# Patient Record
Sex: FEMALE | Race: WHITE | NOT HISPANIC OR LATINO | Employment: OTHER | URBAN - NONMETROPOLITAN AREA
[De-identification: names, ages, dates, MRNs, and addresses within clinical notes are randomized per-mention and may not be internally consistent; named-entity substitution may affect disease eponyms.]

---

## 2023-05-29 LAB — URINE CULTURE: NORMAL

## 2023-08-01 LAB
CHOLESTEROL (MG/DL) IN SER/PLAS: 154 MG/DL (ref 0–199)
CHOLESTEROL IN HDL (MG/DL) IN SER/PLAS: 55.6 MG/DL
CHOLESTEROL/HDL RATIO: 2.8
LDL: 81 MG/DL (ref 0–99)
TRIGLYCERIDE (MG/DL) IN SER/PLAS: 85 MG/DL (ref 0–149)
VLDL: 17 MG/DL (ref 0–40)

## 2023-09-25 PROBLEM — R11.0 CHRONIC NAUSEA: Status: ACTIVE | Noted: 2023-09-25

## 2023-09-25 PROBLEM — R92.8 ABNORMAL MAMMOGRAM OF RIGHT BREAST: Status: ACTIVE | Noted: 2023-09-25

## 2023-09-25 PROBLEM — G43.009 MIGRAINE WITHOUT AURA AND WITHOUT STATUS MIGRAINOSUS, NOT INTRACTABLE: Status: ACTIVE | Noted: 2023-09-25

## 2023-09-25 PROBLEM — F41.0 PANIC DISORDER: Status: ACTIVE | Noted: 2023-09-25

## 2023-09-25 PROBLEM — G47.411 CATAPLEXY (HHS-HCC): Status: ACTIVE | Noted: 2023-09-25

## 2023-09-25 PROBLEM — H69.90 EUSTACHIAN TUBE DYSFUNCTION: Status: ACTIVE | Noted: 2023-09-25

## 2023-09-25 PROBLEM — G47.19 EXCESSIVE DAYTIME SLEEPINESS: Status: ACTIVE | Noted: 2023-09-25

## 2023-09-25 PROBLEM — F31.60 BIPOLAR AFFECTIVE, MIXED (MULTI): Status: ACTIVE | Noted: 2023-09-25

## 2023-09-25 PROBLEM — M79.671 CHRONIC FOOT PAIN, RIGHT: Status: ACTIVE | Noted: 2023-09-25

## 2023-09-25 PROBLEM — N64.3 GALACTORRHEA IN FEMALE: Status: ACTIVE | Noted: 2023-09-25

## 2023-09-25 PROBLEM — N93.9 ABNORMAL VAGINAL BLEEDING: Status: ACTIVE | Noted: 2023-09-25

## 2023-09-25 PROBLEM — K50.90 CROHN'S DISEASE (MULTI): Status: ACTIVE | Noted: 2023-09-25

## 2023-09-25 PROBLEM — G89.4 CHRONIC PAIN SYNDROME: Status: ACTIVE | Noted: 2023-09-25

## 2023-09-25 PROBLEM — G89.29 CHRONIC FOOT PAIN, RIGHT: Status: ACTIVE | Noted: 2023-09-25

## 2023-09-25 PROBLEM — J45.901 ASTHMA EXACERBATION (HHS-HCC): Status: ACTIVE | Noted: 2023-09-25

## 2023-09-25 PROBLEM — E78.5 DYSLIPIDEMIA: Status: ACTIVE | Noted: 2023-09-25

## 2023-09-25 PROBLEM — F44.5 PSYCHIATRIC PSEUDOSEIZURE: Status: ACTIVE | Noted: 2023-09-25

## 2023-09-25 PROBLEM — G47.419 NARCOLEPSY WITHOUT CATAPLEXY (HHS-HCC): Status: ACTIVE | Noted: 2023-09-25

## 2023-09-25 PROBLEM — E55.9 VITAMIN D DEFICIENCY: Status: ACTIVE | Noted: 2023-09-25

## 2023-09-25 PROBLEM — R82.998 URINE WBC INCREASED: Status: ACTIVE | Noted: 2023-09-25

## 2023-09-25 PROBLEM — M41.9 SCOLIOSIS: Status: ACTIVE | Noted: 2023-09-25

## 2023-09-25 PROBLEM — F41.8 MIXED ANXIETY AND DEPRESSIVE DISORDER: Status: ACTIVE | Noted: 2023-09-25

## 2023-09-25 PROBLEM — F41.1 GAD (GENERALIZED ANXIETY DISORDER): Status: ACTIVE | Noted: 2023-09-25

## 2023-09-25 PROBLEM — G25.81 RESTLESS LEGS SYNDROME: Status: ACTIVE | Noted: 2023-09-25

## 2023-09-25 PROBLEM — G89.28 CHRONIC POST-OPERATIVE PAIN: Status: ACTIVE | Noted: 2023-09-25

## 2023-09-25 PROBLEM — F44.9 CONVERSION DISORDER: Status: ACTIVE | Noted: 2023-09-25

## 2023-09-26 RX ORDER — ESOMEPRAZOLE MAGNESIUM 40 MG/1
1 CAPSULE, DELAYED RELEASE ORAL DAILY
COMMUNITY

## 2023-09-26 RX ORDER — PERMETHRIN 50 MG/G
CREAM TOPICAL
COMMUNITY
Start: 2017-03-24 | End: 2024-01-17 | Stop reason: ALTCHOICE

## 2023-09-26 RX ORDER — TOPIRAMATE 100 MG/1
100 TABLET, FILM COATED ORAL 2 TIMES DAILY
COMMUNITY

## 2023-09-26 RX ORDER — TOPIRAMATE 100 MG/1
1 TABLET, FILM COATED ORAL NIGHTLY
COMMUNITY
Start: 2015-08-05 | End: 2024-01-17 | Stop reason: ALTCHOICE

## 2023-09-26 RX ORDER — PROMETHAZINE HYDROCHLORIDE 25 MG/1
1 TABLET ORAL EVERY 12 HOURS PRN
COMMUNITY

## 2023-09-26 RX ORDER — NAPROXEN 500 MG/1
TABLET ORAL
COMMUNITY
Start: 2017-03-22 | End: 2024-01-17 | Stop reason: ALTCHOICE

## 2023-09-26 RX ORDER — RABEPRAZOLE SODIUM 20 MG/1
1 TABLET, DELAYED RELEASE ORAL DAILY
COMMUNITY
End: 2024-01-17 | Stop reason: ALTCHOICE

## 2023-09-26 RX ORDER — NYSTATIN 100000 [USP'U]/ML
SUSPENSION ORAL
COMMUNITY
Start: 2017-01-09 | End: 2024-01-17 | Stop reason: ALTCHOICE

## 2023-09-26 RX ORDER — MENTHOL 40 MG/ML
GEL TOPICAL
COMMUNITY

## 2023-09-26 RX ORDER — HYDROXYZINE PAMOATE 25 MG/1
1 CAPSULE ORAL EVERY 8 HOURS PRN
COMMUNITY
Start: 2016-04-29

## 2023-09-26 RX ORDER — DEXTROAMPHETAMINE SACCHARATE, AMPHETAMINE ASPARTATE, DEXTROAMPHETAMINE SULFATE AND AMPHETAMINE SULFATE 2.5; 2.5; 2.5; 2.5 MG/1; MG/1; MG/1; MG/1
10 TABLET ORAL 2 TIMES DAILY
COMMUNITY
End: 2024-01-17 | Stop reason: ALTCHOICE

## 2023-09-26 RX ORDER — ONDANSETRON HYDROCHLORIDE 8 MG/1
1 TABLET, FILM COATED ORAL EVERY 8 HOURS
COMMUNITY
Start: 2016-02-23 | End: 2024-01-17 | Stop reason: ALTCHOICE

## 2023-09-26 RX ORDER — HYDROXYZINE HYDROCHLORIDE 25 MG/1
1 TABLET, FILM COATED ORAL EVERY 6 HOURS PRN
COMMUNITY
Start: 2023-08-16 | End: 2024-01-17 | Stop reason: ALTCHOICE

## 2023-09-26 RX ORDER — MESALAMINE 500 MG/1
2 CAPSULE, EXTENDED RELEASE ORAL 4 TIMES DAILY
COMMUNITY
Start: 2014-10-17

## 2023-09-26 RX ORDER — PANCRELIPASE 36000; 180000; 114000 [USP'U]/1; [USP'U]/1; [USP'U]/1
CAPSULE, DELAYED RELEASE PELLETS ORAL
COMMUNITY

## 2023-09-26 RX ORDER — DIPHENOXYLATE HYDROCHLORIDE AND ATROPINE SULFATE 2.5; .025 MG/1; MG/1
1 TABLET ORAL 4 TIMES DAILY PRN
COMMUNITY

## 2023-09-26 RX ORDER — PREDNISONE 20 MG/1
TABLET ORAL
COMMUNITY
Start: 2017-03-03 | End: 2024-01-17 | Stop reason: ALTCHOICE

## 2023-09-26 RX ORDER — CLONAZEPAM 1 MG/1
1 TABLET ORAL 2 TIMES DAILY
COMMUNITY
Start: 2015-10-08 | End: 2023-12-20 | Stop reason: ALTCHOICE

## 2023-09-26 RX ORDER — ALBUTEROL SULFATE 90 UG/1
1 AEROSOL, METERED RESPIRATORY (INHALATION) EVERY 4 HOURS
COMMUNITY
End: 2024-01-17 | Stop reason: ALTCHOICE

## 2023-09-26 RX ORDER — DULOXETIN HYDROCHLORIDE 30 MG/1
30 CAPSULE, DELAYED RELEASE ORAL
COMMUNITY
Start: 2023-08-16 | End: 2024-01-17 | Stop reason: ALTCHOICE

## 2023-09-26 RX ORDER — METHYLPREDNISOLONE 4 MG/1
TABLET ORAL
COMMUNITY
Start: 2023-09-20 | End: 2024-01-17 | Stop reason: ALTCHOICE

## 2023-09-26 RX ORDER — DEXTROAMPHETAMINE SACCHARATE, AMPHETAMINE ASPARTATE, DEXTROAMPHETAMINE SULFATE AND AMPHETAMINE SULFATE 2.5; 2.5; 2.5; 2.5 MG/1; MG/1; MG/1; MG/1
1 TABLET ORAL EVERY MORNING
COMMUNITY
Start: 2017-06-28 | End: 2024-01-17 | Stop reason: ALTCHOICE

## 2023-09-26 RX ORDER — MUPIROCIN 20 MG/G
OINTMENT TOPICAL
COMMUNITY
Start: 2017-03-03 | End: 2024-01-17 | Stop reason: ALTCHOICE

## 2023-09-26 RX ORDER — FLUTICASONE FUROATE AND VILANTEROL 100; 25 UG/1; UG/1
1 POWDER RESPIRATORY (INHALATION) DAILY
COMMUNITY

## 2023-09-26 RX ORDER — PREGABALIN 100 MG/1
1 CAPSULE ORAL 3 TIMES DAILY
COMMUNITY
Start: 2016-04-14 | End: 2024-01-17 | Stop reason: ALTCHOICE

## 2023-09-26 RX ORDER — LORATADINE PSEUDOEPHEDRINE SULFATE 10; 240 MG/1; MG/1
1 TABLET, EXTENDED RELEASE ORAL DAILY
COMMUNITY
Start: 2016-10-14 | End: 2024-01-17 | Stop reason: ALTCHOICE

## 2023-09-26 RX ORDER — OXYCODONE AND ACETAMINOPHEN 5; 325 MG/1; MG/1
TABLET ORAL
COMMUNITY
Start: 2017-03-22 | End: 2024-01-17 | Stop reason: ALTCHOICE

## 2023-09-26 RX ORDER — OSELTAMIVIR PHOSPHATE 75 MG/1
CAPSULE ORAL
COMMUNITY
Start: 2017-03-03 | End: 2024-01-17 | Stop reason: ALTCHOICE

## 2023-09-26 RX ORDER — ALBUTEROL SULFATE 90 UG/1
2 AEROSOL, METERED RESPIRATORY (INHALATION) EVERY 4 HOURS PRN
COMMUNITY
Start: 2016-09-26

## 2023-09-26 RX ORDER — SUCRALFATE 1 G/1
1 TABLET ORAL 2 TIMES DAILY
COMMUNITY

## 2023-09-26 RX ORDER — MOMETASONE FUROATE 1 MG/G
OINTMENT TOPICAL
COMMUNITY
Start: 2016-10-05 | End: 2024-01-17 | Stop reason: ALTCHOICE

## 2023-09-26 RX ORDER — VENLAFAXINE HYDROCHLORIDE 150 MG/1
1 CAPSULE, EXTENDED RELEASE ORAL DAILY
COMMUNITY
Start: 2016-03-31 | End: 2024-01-17 | Stop reason: ALTCHOICE

## 2023-09-26 RX ORDER — CLOTRIMAZOLE AND BETAMETHASONE DIPROPIONATE 10; .64 MG/G; MG/G
CREAM TOPICAL
COMMUNITY
Start: 2017-02-22 | End: 2023-12-20 | Stop reason: ALTCHOICE

## 2023-09-26 RX ORDER — FAMOTIDINE 40 MG/1
1 TABLET, FILM COATED ORAL NIGHTLY
COMMUNITY
End: 2023-12-20 | Stop reason: ALTCHOICE

## 2023-09-26 RX ORDER — AMMONIUM LACTATE 12 G/100G
CREAM TOPICAL
COMMUNITY
Start: 2016-07-29

## 2023-09-26 RX ORDER — TOPIRAMATE 25 MG/1
25 TABLET ORAL 2 TIMES DAILY
COMMUNITY
Start: 2023-08-16

## 2023-09-26 RX ORDER — LORATADINE 10 MG/1
1 CAPSULE, LIQUID FILLED ORAL DAILY
COMMUNITY
Start: 2016-10-05 | End: 2024-01-17 | Stop reason: ALTCHOICE

## 2023-09-26 RX ORDER — PREGABALIN 50 MG/1
1 CAPSULE ORAL 2 TIMES DAILY
COMMUNITY

## 2023-09-26 RX ORDER — OMEPRAZOLE 20 MG/1
1 CAPSULE, DELAYED RELEASE ORAL 2 TIMES DAILY
COMMUNITY
End: 2024-01-17 | Stop reason: ALTCHOICE

## 2023-09-26 RX ORDER — HYDROCODONE BITARTRATE AND ACETAMINOPHEN 5; 325 MG/1; MG/1
TABLET ORAL
COMMUNITY
Start: 2017-07-03 | End: 2024-01-17 | Stop reason: ALTCHOICE

## 2023-09-26 RX ORDER — SIMETHICONE 80 MG
TABLET,CHEWABLE ORAL AS NEEDED
COMMUNITY
End: 2024-01-17 | Stop reason: ALTCHOICE

## 2023-09-26 RX ORDER — DEXTROAMPHETAMINE SACCHARATE, AMPHETAMINE ASPARTATE, DEXTROAMPHETAMINE SULFATE AND AMPHETAMINE SULFATE 5; 5; 5; 5 MG/1; MG/1; MG/1; MG/1
1 TABLET ORAL 3 TIMES DAILY
COMMUNITY
End: 2024-01-17 | Stop reason: SDUPTHER

## 2023-09-26 RX ORDER — HYDROCORTISONE 0.5 G/100G
CREAM TOPICAL
COMMUNITY

## 2023-09-26 RX ORDER — POTASSIUM CHLORIDE 20 MEQ/1
2 TABLET, EXTENDED RELEASE ORAL DAILY
COMMUNITY
End: 2024-01-17 | Stop reason: ALTCHOICE

## 2023-09-26 RX ORDER — FLUTICASONE PROPIONATE 50 MCG
2 SPRAY, SUSPENSION (ML) NASAL DAILY
COMMUNITY
Start: 2017-06-28

## 2023-09-26 RX ORDER — ERGOCALCIFEROL 1.25 MG/1
1 CAPSULE ORAL
COMMUNITY
Start: 2016-11-15

## 2023-11-13 ENCOUNTER — APPOINTMENT (OUTPATIENT)
Dept: RADIOLOGY | Facility: HOSPITAL | Age: 53
End: 2023-11-13
Payer: COMMERCIAL

## 2023-11-13 ENCOUNTER — HOSPITAL ENCOUNTER (EMERGENCY)
Facility: HOSPITAL | Age: 53
Discharge: HOME | End: 2023-11-13
Attending: EMERGENCY MEDICINE
Payer: COMMERCIAL

## 2023-11-13 VITALS
BODY MASS INDEX: 26.22 KG/M2 | HEIGHT: 63 IN | WEIGHT: 148 LBS | HEART RATE: 97 BPM | SYSTOLIC BLOOD PRESSURE: 140 MMHG | RESPIRATION RATE: 18 BRPM | DIASTOLIC BLOOD PRESSURE: 97 MMHG | TEMPERATURE: 98.4 F | OXYGEN SATURATION: 100 %

## 2023-11-13 DIAGNOSIS — L01.00 IMPETIGO: ICD-10-CM

## 2023-11-13 DIAGNOSIS — M79.671 FOOT PAIN, RIGHT: ICD-10-CM

## 2023-11-13 DIAGNOSIS — L03.221 CELLULITIS OF NECK: Primary | ICD-10-CM

## 2023-11-13 LAB
ANION GAP SERPL CALC-SCNC: 11 MMOL/L (ref 10–20)
BASOPHILS # BLD AUTO: 0.04 X10*3/UL (ref 0–0.1)
BASOPHILS NFR BLD AUTO: 0.5 %
BUN SERPL-MCNC: 18 MG/DL (ref 6–23)
CALCIUM SERPL-MCNC: 8.7 MG/DL (ref 8.6–10.3)
CHLORIDE SERPL-SCNC: 108 MMOL/L (ref 98–107)
CO2 SERPL-SCNC: 23 MMOL/L (ref 21–32)
CREAT SERPL-MCNC: 0.94 MG/DL (ref 0.5–1.05)
EOSINOPHIL # BLD AUTO: 0.31 X10*3/UL (ref 0–0.7)
EOSINOPHIL NFR BLD AUTO: 3.8 %
ERYTHROCYTE [DISTWIDTH] IN BLOOD BY AUTOMATED COUNT: 15.3 % (ref 11.5–14.5)
GFR SERPL CREATININE-BSD FRML MDRD: 73 ML/MIN/1.73M*2
GLUCOSE SERPL-MCNC: 100 MG/DL (ref 74–99)
HCT VFR BLD AUTO: 35.4 % (ref 36–46)
HGB BLD-MCNC: 11.2 G/DL (ref 12–16)
IMM GRANULOCYTES # BLD AUTO: 0.04 X10*3/UL (ref 0–0.7)
IMM GRANULOCYTES NFR BLD AUTO: 0.5 % (ref 0–0.9)
LYMPHOCYTES # BLD AUTO: 1.55 X10*3/UL (ref 1.2–4.8)
LYMPHOCYTES NFR BLD AUTO: 18.8 %
MCH RBC QN AUTO: 26.3 PG (ref 26–34)
MCHC RBC AUTO-ENTMCNC: 31.6 G/DL (ref 32–36)
MCV RBC AUTO: 83 FL (ref 80–100)
MONOCYTES # BLD AUTO: 0.71 X10*3/UL (ref 0.1–1)
MONOCYTES NFR BLD AUTO: 8.6 %
NEUTROPHILS # BLD AUTO: 5.59 X10*3/UL (ref 1.2–7.7)
NEUTROPHILS NFR BLD AUTO: 67.8 %
NRBC BLD-RTO: 0 /100 WBCS (ref 0–0)
PLATELET # BLD AUTO: 412 X10*3/UL (ref 150–450)
POTASSIUM SERPL-SCNC: 3.6 MMOL/L (ref 3.5–5.3)
RBC # BLD AUTO: 4.26 X10*6/UL (ref 4–5.2)
SODIUM SERPL-SCNC: 138 MMOL/L (ref 136–145)
WBC # BLD AUTO: 8.2 X10*3/UL (ref 4.4–11.3)

## 2023-11-13 PROCEDURE — 87040 BLOOD CULTURE FOR BACTERIA: CPT | Mod: GENLAB | Performed by: EMERGENCY MEDICINE

## 2023-11-13 PROCEDURE — 96365 THER/PROPH/DIAG IV INF INIT: CPT

## 2023-11-13 PROCEDURE — 73630 X-RAY EXAM OF FOOT: CPT | Mod: LT,FR

## 2023-11-13 PROCEDURE — 2500000004 HC RX 250 GENERAL PHARMACY W/ HCPCS (ALT 636 FOR OP/ED): Mod: SE | Performed by: EMERGENCY MEDICINE

## 2023-11-13 PROCEDURE — 99285 EMERGENCY DEPT VISIT HI MDM: CPT | Mod: 25 | Performed by: EMERGENCY MEDICINE

## 2023-11-13 PROCEDURE — 87075 CULTR BACTERIA EXCEPT BLOOD: CPT | Mod: GENLAB | Performed by: EMERGENCY MEDICINE

## 2023-11-13 PROCEDURE — 36415 COLL VENOUS BLD VENIPUNCTURE: CPT | Performed by: EMERGENCY MEDICINE

## 2023-11-13 PROCEDURE — 96361 HYDRATE IV INFUSION ADD-ON: CPT

## 2023-11-13 PROCEDURE — 2550000001 HC RX 255 CONTRASTS: Mod: SE | Performed by: EMERGENCY MEDICINE

## 2023-11-13 PROCEDURE — 80048 BASIC METABOLIC PNL TOTAL CA: CPT | Performed by: EMERGENCY MEDICINE

## 2023-11-13 PROCEDURE — 99284 EMERGENCY DEPT VISIT MOD MDM: CPT | Mod: 25,27

## 2023-11-13 PROCEDURE — 70491 CT SOFT TISSUE NECK W/DYE: CPT | Performed by: RADIOLOGY

## 2023-11-13 PROCEDURE — 85025 COMPLETE CBC W/AUTO DIFF WBC: CPT | Performed by: EMERGENCY MEDICINE

## 2023-11-13 PROCEDURE — 70491 CT SOFT TISSUE NECK W/DYE: CPT

## 2023-11-13 PROCEDURE — 73630 X-RAY EXAM OF FOOT: CPT | Mod: LEFT SIDE | Performed by: RADIOLOGY

## 2023-11-13 RX ORDER — DOXYCYCLINE 100 MG/1
100 TABLET ORAL 2 TIMES DAILY
Qty: 14 TABLET | Refills: 0 | Status: SHIPPED | OUTPATIENT
Start: 2023-11-13 | End: 2023-11-20

## 2023-11-13 RX ORDER — SODIUM CHLORIDE 9 MG/ML
100 INJECTION, SOLUTION INTRAVENOUS CONTINUOUS
Status: DISCONTINUED | OUTPATIENT
Start: 2023-11-13 | End: 2023-11-13 | Stop reason: HOSPADM

## 2023-11-13 RX ORDER — CLINDAMYCIN PHOSPHATE 600 MG/50ML
600 INJECTION, SOLUTION INTRAVENOUS ONCE
Status: COMPLETED | OUTPATIENT
Start: 2023-11-13 | End: 2023-11-13

## 2023-11-13 RX ORDER — CLINDAMYCIN HYDROCHLORIDE 300 MG/1
300 CAPSULE ORAL 3 TIMES DAILY
Qty: 30 CAPSULE | Refills: 0 | Status: SHIPPED | OUTPATIENT
Start: 2023-11-13 | End: 2023-11-23

## 2023-11-13 RX ADMIN — SODIUM CHLORIDE 100 ML/HR: 9 INJECTION, SOLUTION INTRAVENOUS at 08:54

## 2023-11-13 RX ADMIN — CLINDAMYCIN PHOSPHATE 600 MG: 600 INJECTION, SOLUTION INTRAVENOUS at 08:56

## 2023-11-13 RX ADMIN — IOHEXOL 75 ML: 350 INJECTION, SOLUTION INTRAVENOUS at 09:16

## 2023-11-13 ASSESSMENT — PAIN DESCRIPTION - PAIN TYPE: TYPE: ACUTE PAIN

## 2023-11-13 ASSESSMENT — COLUMBIA-SUICIDE SEVERITY RATING SCALE - C-SSRS
1. IN THE PAST MONTH, HAVE YOU WISHED YOU WERE DEAD OR WISHED YOU COULD GO TO SLEEP AND NOT WAKE UP?: NO
2. HAVE YOU ACTUALLY HAD ANY THOUGHTS OF KILLING YOURSELF?: NO
6. HAVE YOU EVER DONE ANYTHING, STARTED TO DO ANYTHING, OR PREPARED TO DO ANYTHING TO END YOUR LIFE?: NO

## 2023-11-13 ASSESSMENT — PAIN SCALES - GENERAL: PAINLEVEL_OUTOF10: 7

## 2023-11-13 ASSESSMENT — PAIN DESCRIPTION - LOCATION: LOCATION: NECK

## 2023-11-13 ASSESSMENT — PAIN - FUNCTIONAL ASSESSMENT: PAIN_FUNCTIONAL_ASSESSMENT: 0-10

## 2023-11-13 NOTE — ED PROVIDER NOTES
EMERGENCY DEPARTMENT                                                     [facility]  ED  Provider Note  11/13/2023  8:09 AM  AC10/AC10                          History of Present Illness:   Benja Luis is a 53 y.o. female presenting to the ED for anterior neck infection and pain in the right second toe, beginning about 10 days ago.  The complaint has been persistent, moderate in severity, and worsened by nothing.  Patient just finished a course of clindamycin for an infection in her anterior neck.  The area remains red tender and swollen.  She also evidence of impetigo below her nose.  She complains of right second toe pain where she had previous osteomyelitis postsurgery.  There is no redness or swelling noted.  She says increasing pain.      Review of Systems:   Pertinent positives and review of systems as noted above.  Remaining 10 review of systems is negative or noncontributory to today's episode of care.  Review of Systems       --------------------------------------------- PAST HISTORY ---------------------------------------------  Past Medical History:  has a past medical history of Cervicalgia, Chronic sinusitis, unspecified, Other cervical disc degeneration, unspecified cervical region, Other conditions influencing health status, Personal history of (healed) traumatic fracture, Personal history of other diseases of the musculoskeletal system and connective tissue, Personal history of other diseases of the musculoskeletal system and connective tissue, Personal history of other diseases of the musculoskeletal system and connective tissue, Personal history of other infectious and parasitic diseases, Personal history of other mental and behavioral disorders, and Personal history of other specified conditions.    Past Surgical History:  has a past surgical history that includes Hysterectomy (09/19/2013); Other surgical history (09/19/2013);  Carpal tunnel release (09/19/2013); and Colonoscopy (09/19/2013).    Social History:  reports that she has never smoked. She has never used smokeless tobacco.    Family History: family history includes Back pain in an other family member. Unless otherwise noted, family history is non contributory    Patient's Medications   New Prescriptions    No medications on file   Previous Medications    ALBUTEROL (PROAIR HFA) 90 MCG/ACTUATION INHALER    Inhale 2 puffs every 4 hours if needed.    ALBUTEROL (PROVENTIL HFA) 90 MCG/ACTUATION INHALER    Inhale 1 puff every 4 hours.    AMMONIUM LACTATE (AMLACTIN) 12 % CREAM    APPLY  AND RUB  IN A THIN FILM TO AFFECTED AREAS TWICE DAILY.(AM AND PM).    AMPHETAMINE-DEXTROAMPHETAMINE (ADDERALL) 10 MG TABLET    Take 1 tablet (10 mg) by mouth once daily in the morning.    AMPHETAMINE-DEXTROAMPHETAMINE (ADDERALL) 10 MG TABLET    Take 1 tablet (10 mg) by mouth 2 times a day.    AMPHETAMINE-DEXTROAMPHETAMINE (ADDERALL) 20 MG TABLET    Take 1 tablet (20 mg) by mouth 3 times a day.    CLINDAMYCIN HCL ORAL    Take by mouth.    CLONAZEPAM (KLONOPIN) 1 MG TABLET    Take 1 tablet (1 mg) by mouth 2 times a day.    CLOTRIMAZOLE-BETAMETHASONE (LOTRISONE) CREAM    Clotrimazole-Betamethasone 1-0.05 % External Cream   Quantity: 45  Refills: 0        Start : 22-Feb-2017   Active    DIPHENOXYLATE-ATROPINE (LOMOTIL) 2.5-0.025 MG TABLET    Take 1 tablet by mouth 4 times a day as needed.    DULOXETINE (CYMBALTA) 30 MG DR CAPSULE    Take 1 capsule (30 mg) by mouth once daily.    ERGOCALCIFEROL (VITAMIN D-2) 1.25 MG (14582 UT) CAPSULE    Take 1 capsule (1,250 mcg) by mouth 1 (one) time per week.    ESOMEPRAZOLE (NEXIUM) 40 MG DR CAPSULE    Take 1 capsule (40 mg) by mouth once daily.    FAMOTIDINE (PEPCID) 40 MG TABLET    Take 1 tablet (40 mg) by mouth once daily at bedtime.    FLUTICASONE (FLONASE) 50 MCG/ACTUATION NASAL SPRAY    Administer 2 sprays into each nostril once daily.    FLUTICASONE  FUROATE-VILANTEROL (BREO ELLIPTA) 100-25 MCG/DOSE INHALER    Inhale 1 puff once daily.    HYDROCODONE-ACETAMINOPHEN (NORCO) 5-325 MG TABLET    HYDROcodone-Acetaminophen 5-325 MG Oral Tablet   Quantity: 30  Refills: 0        Start : 3-Jul-2017   Active    HYDROCORTISONE ACETATE 0.5 % CREAM    Externally    HYDROXYZINE HCL (ATARAX) 25 MG TABLET    Take 1 tablet (25 mg) by mouth every 6 hours if needed for anxiety or itching (or rash).    HYDROXYZINE PAMOATE (VISTARIL) 25 MG CAPSULE    Take 1 capsule (25 mg) by mouth every 8 hours if needed for itching or anxiety.    LORATADINE (CLARITIN LIQUI-GEL) 10 MG CAPSULE    Take 1 capsule by mouth once daily.    LORATADINE-PSEUDOEPHEDRINE (CLARITIN-D 24 HOUR)  MG 24 HR TABLET    Take 1 tablet by mouth once daily.    MENTHOL (BIOFREEZE, MENTHOL,) 4 % GEL    Apply topically to affected area 4 times a day, As Needed    MESALAMINE (PENTASA) 500 MG ER CAPSULE    Take 2 capsules (1,000 mg) by mouth 4 times a day.    METHYLPREDNISOLONE (MEDROL DOSPAK) 4 MG TABLETS    Use as directed    MOMETASONE (ELOCON) 0.1 % OINTMENT    APPLY SPARINGLY TO AFFECTED AREA(S) TWICE DAILY    MUPIROCIN (BACTROBAN) 2 % OINTMENT    Mupirocin 2 % External Ointment   Quantity: 22  Refills: 0        Start : 3-Mar-2017   Active    NAPROXEN (NAPROSYN) 500 MG TABLET    Naproxen 500 MG Oral Tablet   Quantity: 14  Refills: 0        Start : 22-Mar-2017   Active    NYSTATIN (MYCOSTATIN) 100,000 UNIT/ML SUSPENSION    SWISH AND SWALLOW  5 ML 3 TIMES DAILY    OMEPRAZOLE (PRILOSEC) 20 MG DR CAPSULE    Take 1 capsule (20 mg) by mouth 2 times a day.    ONDANSETRON (ZOFRAN) 8 MG TABLET    Take 1 tablet (8 mg) by mouth every 8 hours.    OSELTAMIVIR (TAMIFLU) 75 MG CAPSULE    Oseltamivir Phosphate 75 MG Oral Capsule   Quantity: 10  Refills: 0        Start : 3-Mar-2017   Active    OXYCODONE-ACETAMINOPHEN (PERCOCET) 5-325 MG TABLET    oxyCODONE-Acetaminophen 5-325 MG Oral Tablet   Quantity: 12  Refills: 0        Start :  22-Mar-2017   Active    PANCRELIPASE, LIP-PROT-AMYL, (CREON) 36,000-114,000- 180,000 UNIT CAPSULE,DELAYED RELEASE(DR/EC) CAPSULE    as directed Orally    PERMETHRIN (ELIMITE) 5 % CREAM    Permethrin 5 % External Cream   Quantity: 60  Refills: 0        Start : 24-Mar-2017   Active    POTASSIUM CHLORIDE CR 20 MEQ ER TABLET    Take 2 tablets (40 mEq) by mouth once daily.    PREDNISONE (DELTASONE) 20 MG TABLET    predniSONE 20 MG Oral Tablet   Quantity: 5  Refills: 0        Start : 3-Mar-2017   Active    PREGABALIN (LYRICA) 100 MG CAPSULE    Take 1 capsule (100 mg) by mouth 3 times a day.    PREGABALIN (LYRICA) 50 MG CAPSULE    Take 1 capsule (50 mg) by mouth 2 times a day.    PROMETHAZINE (PHENERGAN) 25 MG TABLET    Take 1 tablet (25 mg) by mouth every 12 hours if needed.    RABEPRAZOLE (ACIPHEX) EC TABLET    Take 1 tablet (20 mg) by mouth once daily.    SIMETHICONE (MYLICON) 80 MG CHEWABLE TABLET    Chew if needed.    SUCRALFATE (CARAFATE) 1 GRAM TABLET    Take 1 tablet (1 g) by mouth 2 times a day. On an empty stomach    TOPIRAMATE (TOPAMAX) 100 MG TABLET    Take 1 tablet (100 mg) by mouth once daily at bedtime.    TOPIRAMATE (TOPAMAX) 100 MG TABLET    Take 1 tablet (100 mg) by mouth 2 times a day.    TOPIRAMATE (TOPAMAX) 25 MG TABLET    Take 1 tablet (25 mg) by mouth twice a day.    VENLAFAXINE XR (EFFEXOR-XR) 150 MG 24 HR CAPSULE    Take 1 capsule (150 mg) by mouth once daily.   Modified Medications    No medications on file   Discontinued Medications    No medications on file      The patient’s home medications have been reviewed.    Allergies: Penicillins, Broccoli, Cabbage, Cauliflower, Diclofenac, Fluoxetine, Gabapentin, Meloxicam, Nalbuphine, Olive extract, Onion, Oseltamivir, Peanut, Peas, Potato, Prochlorperazine, Sesame seed, Soybean, Squash, Sulfa (sulfonamide antibiotics), Sulfamethoxazole-trimethoprim, Sunflower seed, and Ciprofloxacin    -------------------------------------------------- RESULTS  -------------------------------------------------  All laboratory and radiology results have been personally reviewed by myself   LABS:  Labs Reviewed   CBC WITH AUTO DIFFERENTIAL - Abnormal       Result Value    WBC 8.2      nRBC 0.0      RBC 4.26      Hemoglobin 11.2 (*)     Hematocrit 35.4 (*)     MCV 83      MCH 26.3      MCHC 31.6 (*)     RDW 15.3 (*)     Platelets 412      Neutrophils % 67.8      Immature Granulocytes %, Automated 0.5      Lymphocytes % 18.8      Monocytes % 8.6      Eosinophils % 3.8      Basophils % 0.5      Neutrophils Absolute 5.59      Immature Granulocytes Absolute, Automated 0.04      Lymphocytes Absolute 1.55      Monocytes Absolute 0.71      Eosinophils Absolute 0.31      Basophils Absolute 0.04     BASIC METABOLIC PANEL - Abnormal    Glucose 100 (*)     Sodium 138      Potassium 3.6      Chloride 108 (*)     Bicarbonate 23      Anion Gap 11      Urea Nitrogen 18      Creatinine 0.94      eGFR 73      Calcium 8.7     BLOOD CULTURE   BLOOD CULTURE         RADIOLOGY:  Interpreted by Radiologist.  CT soft tissue neck w IV contrast   Final Result   1. Focal skin thickening and subcutaneous edema within the submental   space is probably sequela of an infectious process. No focal fluid   collection to suggest an abscess.        2. Small air-fluid level in the right maxillary sinus is a   nonspecific finding but can be seen with acute sinusitis, correlate   clinically.        This study was interpreted at [facility].             MACRO:   None        Signed by: Jim Eagle 11/13/2023 9:29 AM   Dictation workstation:   [id number]      XR foot left 3+ views   Final Result   No acute osseous abnormality.   Signed by Jose Walton MD          No results found for this or any previous visit (from the past 4464 hour(s)).  ------------------------- NURSING NOTES AND VITALS REVIEWED ---------------------------   The nursing notes within the ED encounter and  "vital signs as below have been reviewed.   BP (!) 140/97   Pulse 97   Temp 36.9 °C (98.4 °F) (Temporal)   Resp 18   Ht 1.6 m (5' 3\")   Wt 67.1 kg (148 lb)   SpO2 100%   BMI 26.22 kg/m²   Oxygen Saturation Interpretation: Normal      ---------------------------------------------------PHYSICAL EXAM--------------------------------------  Physical Exam   Constitutional/General: Alert and oriented x3, well appearing, non toxic in NAD  Head: Normocephalic and atraumatic  Eyes: PERRL, EOMI, conjunctiva normal, sclera non icteric  Mouth: Oropharynx clear, handling secretions, no trismus, no asymmetry of the posterior oropharynx or uvular edema  Neck: Supple, full ROM, non tender to palpation in the midline, no stridor, no crepitus, no meningeal signs  Respiratory: Lungs clear to auscultation bilaterally, no wheezes, rales, or rhonchi. Not in respiratory distress  Cardiovascular:  Regular rate. Regular rhythm. No murmurs, gallops, or rubs. 2+ distal pulses  Chest: No chest wall tenderness  GI:  Abdomen Soft, Non tender, Non distended.  +BS. No organomegaly, no palpable masses,  No rebound, guarding, or rigidity.   Musculoskeletal: Moves all extremities x 4. Warm and well perfused, no clubbing, cyanosis, or edema. Capillary refill <3 seconds  Integument: skin warm and dry.  Lymphatic: no lymphadenopathy noted  Neurologic: GCS 15, no focal deficits, symmetric strength 5/5 in the upper and lower extremities bilaterally  Psychiatric: Normal Affect    Procedures    ------------------------------ ED COURSE/MEDICAL DECISION MAKING----------------------    Medical Decision Making:   Patient has cellulitis in her anterior submental neck, impetigo below her nose and some mild pain in her right second toe.  CT scan of the neck shows thickening of the skin but no fluid collection or phlegmon.  X-ray of the foot shows evidence of osteomyelitis.  White blood cell count is normal.  Vital signs are stable.  She will manage with warm " compresses continue antibiotics and follow-up with her doctor in 1 week.  Diagnoses as of 11/13/23 1022   Cellulitis of neck   Foot pain, right   Impetigo      Counseling:   The emergency provider has spoken with the patient and discussed today’s results, in addition to providing specific details for the plan of care and counseling regarding the diagnosis and prognosis.  Questions are answered at this time and they are agreeable with the plan.      --------------------------------- IMPRESSION AND DISPOSITION ---------------------------------        IMPRESSION  1. Cellulitis of neck    2. Foot pain, right    3. Impetigo        DISPOSITION  Disposition: Discharge to home  Patient condition is fair      Billing Provider Critical Care Time: 0 minutes     Edward Handy MD  11/13/23 1026

## 2023-11-13 NOTE — DISCHARGE INSTRUCTIONS
Doxycycline and clindamycin as prescribed.    Take Tylenol for pain every 4-6 hours as needed.    Use warm compresses 3 times per day on the skin infection on your neck.  Do not squeeze or poke at this lesion.    See your doctor 1 week for recheck.    Return for worsening symptoms or concerns

## 2023-11-17 LAB
BACTERIA BLD CULT: NORMAL
BACTERIA BLD CULT: NORMAL

## 2023-12-20 ENCOUNTER — OFFICE VISIT (OUTPATIENT)
Dept: PULMONOLOGY | Facility: CLINIC | Age: 53
End: 2023-12-20
Payer: COMMERCIAL

## 2023-12-20 VITALS
BODY MASS INDEX: 26.04 KG/M2 | HEART RATE: 83 BPM | DIASTOLIC BLOOD PRESSURE: 83 MMHG | WEIGHT: 147 LBS | OXYGEN SATURATION: 99 % | SYSTOLIC BLOOD PRESSURE: 122 MMHG

## 2023-12-20 DIAGNOSIS — J45.909 ASTHMA, UNSPECIFIED ASTHMA SEVERITY, UNSPECIFIED WHETHER COMPLICATED, UNSPECIFIED WHETHER PERSISTENT (HHS-HCC): Primary | ICD-10-CM

## 2023-12-20 DIAGNOSIS — R06.02 SHORTNESS OF BREATH: ICD-10-CM

## 2023-12-20 DIAGNOSIS — G47.30 SLEEP-DISORDERED BREATHING: ICD-10-CM

## 2023-12-20 PROCEDURE — 99205 OFFICE O/P NEW HI 60 MIN: CPT | Performed by: PEDIATRICS

## 2023-12-20 PROCEDURE — 1036F TOBACCO NON-USER: CPT | Performed by: PEDIATRICS

## 2023-12-20 NOTE — PROGRESS NOTES
Subjective   Patient ID: Benja Luis is a 53 y.o. female who presents for asthma, narcolepsy, SOB     HPI    NOTE:  records from [facility] pulmonary are in media section dated 4/22/2019    Ms Luis is a 53yr old with asthma well controlled with breo and occasional albuterol.  She is here to establish care.  She had previously seem Dr De Los Santos at [facility] but per the patient he told her she was too complex for him to manage due to her many medical issues.  She complains that she doesn't get any oxygen and gets very short of breath any time she eats.  She also complains that if she eats hot food the roof of her mouth gets very itchy.  She was told at some point she had eosinophilic esophagitis and she feels that is flairing up as well, that causes epigastric pain, chest wall pain and right shoulder blade pain.     She is no longer seeing an allergist  She has a diagnosis of narcolepsy and was treated at [facility] for that.    She never smoked    Review of Systems    See scanned documents attached to this note for review of systems, and appropriate scales/scores for this visit.     Objective   Physical Exam  Constitutional:       Appearance: Normal appearance.   HENT:      Head: Normocephalic and atraumatic.      Mouth/Throat:      Pharynx: Oropharynx is clear.   Cardiovascular:      Rate and Rhythm: Normal rate and regular rhythm.      Pulses: Normal pulses.      Heart sounds: Normal heart sounds.   Pulmonary:      Effort: Pulmonary effort is normal.      Breath sounds: Normal breath sounds. No wheezing, rhonchi or rales.   Abdominal:      General: Bowel sounds are normal.      Palpations: Abdomen is soft.   Musculoskeletal:         General: Normal range of motion.   Skin:     General: Skin is warm and dry.   Neurological:      General: No focal deficit present.      Mental Status: She is alert and oriented to person, place, and time.   Psychiatric:         Mood and Affect: Mood normal.             Assessment/Plan     53yr old with  asthma, ?eosinophilic esophagitis, complex sleep disorder, ?allergic food reactions    Asthma:  seems the least of her issues currently  - will get PFT to establish baseline  - continue breo and alburterol as needed    Sleep disorder:  ?narcolepsy  - will set up with sleep medicine    3. ?Eosinophilic esophagitis:  - follow up with GI  - allergy referral (also many food allergies per patient and these apparently exacerbate her asthma    Follow up with Giovana Rice and myself after PFT           Bryan Davis MD 12/20/23 2:37 PM

## 2024-01-03 ENCOUNTER — APPOINTMENT (OUTPATIENT)
Dept: SURGERY | Facility: CLINIC | Age: 54
End: 2024-01-03
Payer: MEDICARE

## 2024-01-05 ENCOUNTER — APPOINTMENT (OUTPATIENT)
Dept: RADIOLOGY | Facility: HOSPITAL | Age: 54
End: 2024-01-05
Payer: MEDICARE

## 2024-01-10 ENCOUNTER — APPOINTMENT (OUTPATIENT)
Dept: SURGERY | Facility: CLINIC | Age: 54
End: 2024-01-10
Payer: MEDICARE

## 2024-01-10 ENCOUNTER — APPOINTMENT (OUTPATIENT)
Dept: RADIOLOGY | Facility: HOSPITAL | Age: 54
End: 2024-01-10
Payer: MEDICARE

## 2024-01-15 ENCOUNTER — APPOINTMENT (OUTPATIENT)
Dept: RADIOLOGY | Facility: HOSPITAL | Age: 54
End: 2024-01-15
Payer: MEDICARE

## 2024-01-17 ENCOUNTER — APPOINTMENT (OUTPATIENT)
Dept: PULMONOLOGY | Facility: CLINIC | Age: 54
End: 2024-01-17
Payer: MEDICARE

## 2024-01-17 ENCOUNTER — HOSPITAL ENCOUNTER (OUTPATIENT)
Dept: RADIOLOGY | Facility: HOSPITAL | Age: 54
Discharge: HOME | End: 2024-01-17
Payer: MEDICARE

## 2024-01-17 ENCOUNTER — OFFICE VISIT (OUTPATIENT)
Dept: SLEEP MEDICINE | Facility: CLINIC | Age: 54
End: 2024-01-17
Payer: MEDICARE

## 2024-01-17 ENCOUNTER — APPOINTMENT (OUTPATIENT)
Dept: RADIOLOGY | Facility: HOSPITAL | Age: 54
End: 2024-01-17
Payer: MEDICARE

## 2024-01-17 VITALS
BODY MASS INDEX: 26.36 KG/M2 | DIASTOLIC BLOOD PRESSURE: 87 MMHG | OXYGEN SATURATION: 98 % | HEART RATE: 84 BPM | SYSTOLIC BLOOD PRESSURE: 126 MMHG | WEIGHT: 148.8 LBS

## 2024-01-17 DIAGNOSIS — N64.89 OTHER SPECIFIED DISORDERS OF BREAST: ICD-10-CM

## 2024-01-17 DIAGNOSIS — G47.19 EXCESSIVE DAYTIME SLEEPINESS: ICD-10-CM

## 2024-01-17 DIAGNOSIS — G47.30 SLEEP-DISORDERED BREATHING: ICD-10-CM

## 2024-01-17 DIAGNOSIS — E66.3 OVERWEIGHT (BMI 25.0-29.9): ICD-10-CM

## 2024-01-17 DIAGNOSIS — Z79.899 MEDICATION MANAGEMENT: ICD-10-CM

## 2024-01-17 DIAGNOSIS — R53.83 FATIGUE, UNSPECIFIED TYPE: ICD-10-CM

## 2024-01-17 DIAGNOSIS — G47.419 PRIMARY NARCOLEPSY WITHOUT CATAPLEXY (HHS-HCC): Primary | ICD-10-CM

## 2024-01-17 PROCEDURE — 77061 BREAST TOMOSYNTHESIS UNI: CPT | Mod: RT

## 2024-01-17 PROCEDURE — G0279 TOMOSYNTHESIS, MAMMO: HCPCS | Mod: RIGHT SIDE | Performed by: RADIOLOGY

## 2024-01-17 PROCEDURE — 3008F BODY MASS INDEX DOCD: CPT

## 2024-01-17 PROCEDURE — 99205 OFFICE O/P NEW HI 60 MIN: CPT

## 2024-01-17 PROCEDURE — 77065 DX MAMMO INCL CAD UNI: CPT | Mod: RIGHT SIDE | Performed by: RADIOLOGY

## 2024-01-17 PROCEDURE — 1036F TOBACCO NON-USER: CPT

## 2024-01-17 RX ORDER — DEXTROAMPHETAMINE SACCHARATE, AMPHETAMINE ASPARTATE, DEXTROAMPHETAMINE SULFATE AND AMPHETAMINE SULFATE 2.5; 2.5; 2.5; 2.5 MG/1; MG/1; MG/1; MG/1
10 TABLET ORAL 3 TIMES DAILY
Qty: 90 TABLET | Refills: 0 | Status: SHIPPED | OUTPATIENT
Start: 2024-01-17 | End: 2024-02-23 | Stop reason: SDUPTHER

## 2024-01-17 RX ORDER — DEXTROAMPHETAMINE SACCHARATE, AMPHETAMINE ASPARTATE, DEXTROAMPHETAMINE SULFATE AND AMPHETAMINE SULFATE 5; 5; 5; 5 MG/1; MG/1; MG/1; MG/1
1 TABLET ORAL 3 TIMES DAILY
Qty: 90 TABLET | Refills: 0 | Status: SHIPPED | OUTPATIENT
Start: 2024-01-17 | End: 2024-01-17 | Stop reason: ENTERED-IN-ERROR

## 2024-01-17 ASSESSMENT — ANXIETY QUESTIONNAIRES
6. BECOMING EASILY ANNOYED OR IRRITABLE: NEARLY EVERY DAY
3. WORRYING TOO MUCH ABOUT DIFFERENT THINGS: NEARLY EVERY DAY
1. FEELING NERVOUS, ANXIOUS, OR ON EDGE: NEARLY EVERY DAY
7. FEELING AFRAID AS IF SOMETHING AWFUL MIGHT HAPPEN: NEARLY EVERY DAY
GAD7 TOTAL SCORE: 21
5. BEING SO RESTLESS THAT IT IS HARD TO SIT STILL: NEARLY EVERY DAY
4. TROUBLE RELAXING: NEARLY EVERY DAY
2. NOT BEING ABLE TO STOP OR CONTROL WORRYING: NEARLY EVERY DAY

## 2024-01-17 ASSESSMENT — SLEEP AND FATIGUE QUESTIONNAIRES
HOW LIKELY ARE YOU TO NOD OFF OR FALL ASLEEP WHEN YOU ARE A PASSENGER IN A CAR FOR AN HOUR WITHOUT A BREAK: MODERATE CHANCE OF DOZING
WORRIED_DISTRESSED_DUE_TO_SLEEP: MUCH
HOW LIKELY ARE YOU TO NOD OFF OR FALL ASLEEP WHILE SITTING QUIETLY AFTER LUNCH WITHOUT ALCOHOL: HIGH CHANCE OF DOZING
HOW LIKELY ARE YOU TO NOD OFF OR FALL ASLEEP WHILE SITTING AND TALKING TO SOMEONE: MODERATE CHANCE OF DOZING
SITING INACTIVE IN A PUBLIC PLACE LIKE A CLASS ROOM OR A MOVIE THEATER: HIGH CHANCE OF DOZING
HOW LIKELY ARE YOU TO NOD OFF OR FALL ASLEEP WHILE WATCHING TV: HIGH CHANCE OF DOZING
SATISFACTION_WITH_CURRENT_SLEEP_PATTERN: DISSATISFIED
HOW LIKELY ARE YOU TO NOD OFF OR FALL ASLEEP IN A CAR, WHILE STOPPED FOR A FEW MINUTES IN TRAFFIC: HIGH CHANCE OF DOZING
DIFFICULTY_STAYING_ASLEEP: MODERATE
HOW LIKELY ARE YOU TO NOD OFF OR FALL ASLEEP WHILE LYING DOWN TO REST IN THE AFTERNOON WHEN CIRCUMSTANCES PERMIT: MODERATE CHANCE OF DOZING
SLEEP_PROBLEM_NOTICEABLE_TO_OTHERS: VERY MUCH NOTICEABLE
ESS-CHAD TOTAL SCORE: 21
SLEEP_PROBLEM_INTERFERES_DAILY_ACTIVITIES: VERY MUCH NOTICEABLE
HOW LIKELY ARE YOU TO NOD OFF OR FALL ASLEEP WHILE SITTING AND READING: HIGH CHANCE OF DOZING

## 2024-01-17 ASSESSMENT — PATIENT HEALTH QUESTIONNAIRE - PHQ9
2. FEELING DOWN, DEPRESSED OR HOPELESS: NEARLY EVERY DAY
SUM OF ALL RESPONSES TO PHQ9 QUESTIONS 1 AND 2: 6
1. LITTLE INTEREST OR PLEASURE IN DOING THINGS: NEARLY EVERY DAY

## 2024-01-17 NOTE — PROGRESS NOTES
Patient: Benja Luis  : 1970 AGE: 53 y.o. SEX:female   MRN: [id number]   Provider: [facility]     Location [facility]   Service Date: 2024     PCP: David Singer MD   Referred by: Bryan Davis, *            [facility]  NEW PATIENT VISIT NOTE        PATIENT INFORMATION     The patient's referring provider is: Bryan Davis, *  The patient's Primary Care Provider: David Singer MD    HISTORY OF PRESENT ILLNESS     Patient ID: Benja Luis is a 53 y.o. female who presents to a [facility] for evaluation for evaluation for sleep apnea, Excessive Daytime Sleepiness (EDS), Fatigue, Narcolepsy, Hypersomnia, and Medication Management    Patient is here alone today.  The patient has pertinent hx of Narcolepsy without cataplexy, inadequate sleep hygiene, EDS, fatigue, overweight, Asthma, psychiatric pseudoseizure, seasonal allergies, Bipolar, depression, anxiety, conversion disorder, RLS, Chrons disease, scoliosis, back pain, migraines, and chronic pain.     Patient here today to get established for treatment of narcolepsy. Patient was a previous patient of Dr. Gonzalez, who has not seen the patient in over 1 year (last seen ). Patient has continued to take Adderall 20 mg TID. However, per OARRS report, she has not filled the prescription in 2022. Her most recent urine drug screen from [facility] was positive for amphetamines. She reports she is getting a prescription for 20 mg TID, which she has prescription bottle with her name and recent prescribed from [facility]. We had a lengthy discussion about stimulant and controlled substances. She will need to complete a urine drug screen prior to next refill of medication. I will start the patient on Adderall 10 mg BID and go from there. She is agreeable. CSA was signed today in clinic. Reviewed EKD from ED in 2023 QT/QTC was 394/465, most  recent stress test and echo were WNL.     Known hx of narcolepsy. Patient has been treated in the past with Ritalin, did not help long-term, was switched to Adderall which has been helping up till a few months ago. She started noticing she is falling asleep again during regular activities such as cooking dinner or talking with others. She has not tried Modafinil or Armodafinil, Wakix or Xywav. She is slightly familiar with Xywav as Dr. Gonzalez has suggested in the past.  She denied any symptoms of cataplexy. I do not have any testing from [facility], plan to start with PSG + MSLT. I did discuss with patient that if PSG is positive we will start on PAP therapy. If negative they will proceed with MSLT. She is currently on anti-depressants which her BiPolar is stable. Will not discontinue medications at this time. Continue with prescribed medications. Instructed patient to stop stimulant the day prior to her sleep study, resume after testing. Sleep log was given to patient today in clinic.     RLS symptoms controlled fairly well with medication. Not bothersome to the patient at this time. Does have chronic back and generalized body pains. Currently on Tramadol. Did have an accidental overdose in 7/2023 on Tramadol, she continues to be prescribed Tramadol since then with no other reported incidents. We discussed any illicit drug use or abuse of prescription will results in dismissal from practice as well as no prescription refills. She verbalized understanding. Plan to get urine drug screen before next refill.     Patient reports that most recently she has started to notice that she is awakening during the night gasping for air. Previous testing for HILDA was negative. Potential for HILDA, plan to start with PSG + MSLT. If PSG is positive, will start with PAP therapy first.       SLEEP HISTORY     SLEEP STUDY HISTORY  No results available for review    SLEEP-WAKE SCHEDULE  Bedtime:  7 - 9 pm daily  Subjective sleep latency: less  than 5 mins  Difficulty falling asleep: No    Number of awakenings: rarely   Falls back asleep in few minutes  Difficulty staying asleep: No   Final wake time:  4 am daily  Out of bed time:  4 am daily  Shift work: denied  Naps: throughout the day 10 - 15 mins  Feels rested after a nap: Yes   Average sleep duration (excluding naps): 6 - 7 hrs    SLEEP ENVIRONMENT  Sleep location: bed  Sleep status: sleeps alone  Preferred sleep position: back  TV in bedroom: no  Room is dark:  Yes  Room is quiet: Yes  Room is cool: Yes  Bed comfort: good    SLEEP HABITS   Activities before bedtime: TV  Activities in bed: none  Clockwatching: No   Smoking: never  ETOH: denied  Marijuana: denied  Caffeine: denied  Sleep aids: denied    WEIGHT: stable    Claustrophobia: No     STOP-BAN  ESS: 21   FAN: 16  PHQ-2:  POSITIVE  little interest or pleasure = 3  down, depressed or hopeless = 3  TAMMI-7: 21      REVIEW OF SYSTEMS     REVIEW OF SYSTEMS  Sleep-related ROS:  Night symptoms: POSITIVE for wake up gasping and/or choking for air and NEGATIVE for snoring, witnessed apnea, nasal congestion and/or post nasal drip, mouth breathing, night sweats during sleep, waking up with racing heart, heartburn or sour taste in mouth at night, nocturnal cough, and nocturia  Morning symptoms: POSITIVE for unrefreshing sleep and NEGATIVE for morning headache, morning dry mouth, and morning sore throat  Daytime symptoms POSITIVE for excessive daytime sleepiness, fatigue, trouble remembering things in daytime, trouble staying focused in daytime, and drowsy driving and NEGATIVE for irritability in daytime  Hypersomnia / narcolepsy symptoms: POSITIVE for daytime sleepiness since young age, sleep-related hallucinations, and disturbed nocturnal sleep  Parasomnia symptoms: Patient denies symptoms of parasomnia.  Movements in sleep: Patient denies problematic movements in sleep,     RLS screen:  RLSSCREEN: - Sensations: Patient has unusual sensations in their  extremities that cause an urge to move them   - Frequency: infrequently   - Relief: Symptoms ARE/ARENOT: are not relieved with movement   - Circadian: Symptoms ARE/ARENOT: are not typically improved later in the night.   - Movement: Patient has not been told that their legs kick or jerk during sleep    Naps:   Yes. Naps ARE/ARENOT: are refreshing.  Fatigue: struggles to carry out day to day responsibilities.      All other systems have been reviewed and are negative.      ALLERGIES AND MEDICATIONS     ALLERGIES  Allergies   Allergen Reactions    Penicillins Anaphylaxis, Hives and Swelling    Broccoli Unknown    Cabbage Unknown    Cauliflower Unknown    Diclofenac Unknown    Fluoxetine Unknown    Gabapentin Unknown    Meloxicam Unknown    Nalbuphine Unknown    Olive Extract Unknown    Onion Unknown    Oseltamivir Unknown    Peanut Unknown    Peas Unknown    Potato Unknown    Prochlorperazine Unknown    Sesame Seed Unknown    Soybean Unknown    Squash Unknown    Sulfa (Sulfonamide Antibiotics) Unknown    Sulfamethoxazole-Trimethoprim Unknown    Sunflower Seed Unknown    Ciprofloxacin Rash, Other and Unknown     Respiratory distress        MEDICATIONS  Current Outpatient Medications   Medication Sig Dispense Refill    albuterol (ProAir HFA) 90 mcg/actuation inhaler Inhale 2 puffs every 4 hours if needed.      ammonium lactate (Amlactin) 12 % cream APPLY  AND RUB  IN A THIN FILM TO AFFECTED AREAS TWICE DAILY.(AM AND PM).      amphetamine-dextroamphetamine (Adderall) 10 mg tablet Take 1 tablet (10 mg) by mouth 3 times a day. 90 tablet 0    CLINDAMYCIN HCL ORAL Take by mouth.      diphenoxylate-atropine (LomotiL) 2.5-0.025 mg tablet Take 1 tablet by mouth 4 times a day as needed.      ergocalciferol (Vitamin D-2) 1.25 MG (40380 UT) capsule Take 1 capsule (1,250 mcg) by mouth 1 (one) time per week.      esomeprazole (NexIUM) 40 mg DR capsule Take 1 capsule (40 mg) by mouth once daily.      fluticasone (Flonase) 50  mcg/actuation nasal spray Administer 2 sprays into each nostril once daily.      fluticasone furoate-vilanteroL (Breo Ellipta) 100-25 mcg/dose inhaler Inhale 1 puff once daily.      hydrocortisone acetate 0.5 % cream Externally      hydrOXYzine pamoate (Vistaril) 25 mg capsule Take 1 capsule (25 mg) by mouth every 8 hours if needed for itching or anxiety.      menthol (Biofreeze, menthol,) 4 % gel Apply topically to affected area 4 times a day, As Needed      mesalamine (Pentasa) 500 mg ER capsule Take 2 capsules (1,000 mg) by mouth 4 times a day.      pancrelipase, Lip-Prot-Amyl, (Creon) 36,000-114,000- 180,000 unit capsule,delayed release(DR/EC) capsule as directed Orally      pregabalin (Lyrica) 50 mg capsule Take 1 capsule (50 mg) by mouth 2 times a day.      promethazine (Phenergan) 25 mg tablet Take 1 tablet (25 mg) by mouth every 12 hours if needed.      sucralfate (Carafate) 1 gram tablet Take 1 tablet (1 g) by mouth 2 times a day. On an empty stomach      topiramate (Topamax) 100 mg tablet Take 1 tablet (100 mg) by mouth 2 times a day.      topiramate (Topamax) 25 mg tablet Take 1 tablet (25 mg) by mouth twice a day.       No current facility-administered medications for this visit.         PAST HISTORY     PERTINENT PAST MEDICAL HISTORY: See HPI    PERTINENT PAST SURGICAL HISTORY for Sleep Medicine:  non-contributory    PERTINENT FAMILY HISTORY for Sleep Medicine:  Unknown. Patient is adopted.     PERTINENT SOCIAL HISTORY:  She  reports that she has never smoked. She has never used smokeless tobacco. No history on file for alcohol use and drug use. She currently lives alone and employed full-time    Active Problems, Allergy List, Medication List, and PMH/PSH/FH/Social Hx have been reviewed and reconciled in chart. No significant changes unless documented in the pertinent chart section. Updates made when necessary.       PHYSICAL EXAM     VITAL SIGNS: /87   Pulse 84   Wt 67.5 kg (148 lb 12.8 oz)    "SpO2 98%   BMI 26.36 kg/m²     NECK CIRCUMFERENCE:     CURRENT WEIGHT:   Vitals:    01/17/24 1127   Weight: 67.5 kg (148 lb 12.8 oz)      BMI: Body mass index is 26.36 kg/m².    PREVIOUS WEIGHTS:  Wt Readings from Last 3 Encounters:   01/17/24 67.5 kg (148 lb 12.8 oz)   12/20/23 66.7 kg (147 lb)   11/13/23 67.1 kg (148 lb)       Physical Exam  Constitutional: Awake, not in distress  Lungs: Clear to auscultation bilateral, no cough noted  Heart: Regular rate and rhythm  Skin: Warm, no rash  Neuro: No tremors, moves all extremities  Psych: alert and oriented to time, place, and person    ENT: Modified Mallampati score - III            RESULTS/DATA     No results found for: \"IRON\", \"TRANSFERRIN\", \"IRONSAT\", \"TIBC\", \"FERRITIN\"    Bicarbonate   Date Value Ref Range Status   11/13/2023 23 21 - 32 mmol/L Final       PAP Adherence  Not applicable      ASSESSMENT/PLAN     Assessment/Plan   Benja Luis is a 53 y.o. female presents today in [facility] with the following problems:      SUSPECTED SLEEP APNEA ,   - Patient's risk factors for HILDA: age, postmenopause, asthma, and narrow crowded upper airway anatomy  - Current symptoms are: see HPI  - HILDA negative in past by PSG, no data available for review  - Due to negative testing in past and hx of narcolepsy, I recommend split-night PSG (Split if AHI>10) to evaluate for HILDA. + MSLT  - Discussed HILDA pathophysiology, diagnostic testing (HST vs PSG), cardiometabolic and neurocognitive sequelae of untreated HILDA, and treatment options (PAP therapy, oral appliance, surgery, hypoglossal nerve stimulator called INSPIRE, etc).      EXCESSIVE DAYTIME SLEEPINESS (EDS) / FATIGUE   - due to combination of depression, anxiety, untreated sleep apnea, chronic pain, and narcoplepsy . Meds may be a contributing factor as well.  - discussed with patient good sleep hygiene   - Important to get good daily dose of natural sunlight to help wakefulness & energy  - " Reduce artificial lighting at night, especially light from screens (i.e. cellphones, TV, tablets)  - Exercise is helpful for your mental and physical health  - Have a consistent bedtime routine 1 hours prior to your usual bedtime  - If going to take a nap, it should be less than 30 minutes and prior to 3 pm  - Limit alcohol and caffeine use   - Avoidance of marijuana and/or CBD use    NARCOLEPSY  - previously diagnosed via [facility], no data available to review  - Will order an overnight sleep study, followed by MSLT the next day  - Perform urine toxicology prior to sleep study.  - Will call patient within 3 weeks after the test. Will discuss follow up plan at that time.  - May consider checking TSH and iron studies to rule out underlying metabolic etiologies.  -Scheduled naps  -Consolidate night time sleep.  - will continue with Adderall 20 mg for treatment   - previously tried Ritalin, has been on Adderall for over 4 years, stable till few months ago  - see med management below      OVERWEIGHT  - BMI today Body mass index is 26.36 kg/m².    - discussed stimulant can reduce appetite, will need to monitor weight  - Encouraged patient to lose weight with diet and exercise.   - Weight loss can help in the long term treatment of HILDA.  - Defer management to PCP      Medication Management - Adderall  I have personally reviewed the OARRS report for this patient. This report is scanned into the electronic medical record. I have considered the risks of abuse, dependence, addiction and diversion. I believe that it is clinically appropriate for this patient to be prescribed this medication.   Based on the above findings and the clinical response to the controlled substance medications and improvement of the activities of daily living, sleep, and work performance. We made this complex decision to continue this therapy in light of the evidence of the patient's responsibility in using these medications as prescribed for their  condition.   I explained and discussed with the patient and stressed the importance of knowing the side effects and the addicting and habit forming nature of the dangerous drugs we are using to treat the symptoms.  - will need urine drug screen prior to next refill  - hx of hysterectomy, reviewed EKG and echo - okay to proceed with continuation of medication   - may switch to Wakix after testing    DEPRESSION  / ANXIETY  / BIPOLAR   + screens  - denies any SI, HI or hallucinations   - currently on meds, well controlled  - established with [facility], see psych and therapy  - defer management to Psychiatry     SMOKING STATUS screen  - never a smoker     All of patient's questions were answered. She verbalizes understanding and agreement with my assessment and plan.

## 2024-01-17 NOTE — PATIENT INSTRUCTIONS
It was a pleasure meeting you today Benja Luis     As we discussed today in clinic:    1. Do in-lab sleep study + MSLT  2. Encouraged to lose weight.   3. Remember, don't drive when sleepy.   4. Stay off your back when sleeping.  5. I took over your prescription of Adderall for Narcolepsy. Plan in the future is for Wakix  6. Track your sleep for two weeks prior to sleep study. Do not take any stimulants the day before you sleep study. Continue all over prescribed medications       As a general guideline, please replace your: PAP cushions every 2-4 weeks, mask every 3-6 months, hose every 3-6 months, Filter (disposable) every 2-4 weeks; machine may need replacement in 5-10 years (once they stop working).     Education handouts given: Sleep Hygiene  and Sleep Logs    Please follow-up in 2 months    ALWAYS BRING YOUR CPAP / BIPAP WITH YOU TO EVERY APPOINTMENT!  THANKS    EDUCATION WEBSITES for further information:   1. American Academy of Sleep Medicine http://sleepeducation.org  2. National Sleep Foundation: https://sleepfoundation.org  3. American Sleep Apnea Association: https://www.sleepapnea.org (for patients with sleep apnea)  4. Go to www.inspiresleep.com learn about Inspire Implant.    FOR QUESTIONS AND CONCERNS:   1. In case of problems with machine or mask interface, please contact your DME company first. DME is the company that provides you the machine and/or CPAP supplies. If [employer] if your DME, you can reach them at [phone] or [employer] ([employer]) [phone].  2. For SLEEP STUDY appointments, please call [phone] ([facility]) or [phone] / [phone] ([facility]) or any other  Sleep Lab location please call [phone]  3. For MEDICAL QUESTIONS, MEDICATION REFILLS, or CLINIC APPOINTMENT SCHEDULING, please call [phone] and my practice lead Anne would gladly assist you with any concerns.   4. In the event that you are running more than 10 minutes late to your  appointment or 5 minutes to virtual, I will kindly ask you to reschedule.    Here at [facility], we wish you a restful sleep!

## 2024-01-17 NOTE — LETTER
NARCOLEPSY    Narcolepsy is a chronic neurologic disorder characterized by a decreased ability to regulate sleep-wake cycles. It is characterized by severe and persistent daytime sleepiness even despite getting enough sleep at night. The main symptom of narcolepsy is excessive daytime sleepiness that leads to an irrepressible need to sleep and daytime lapses into sleep. Other commonly seen symptoms include     Cataplexy is sudden and brief loss of muscle control and tone when experiencing strong intense emotion like laughter, surprise, or anger.   Hypnagogic hallucinations and hypnopompic hallucinations are vivid dream-like hallucinations occurring while falling asleep and upon waking up respectively, oftentimes frightening.   Sleep paralysis is a feeling of not being able to talk or move for a brief period either when falling asleep or just after waking up. Touching the person usually causes the sleep paralysis to disappear, although it usually just ends on its own.   Disturbed nighttime sleep  Automatic behaviors involve continuing a familiar, routine, or boring task without being fully aware or later remembering doing it. A person is actually asleep during the activity.   Still, other symptoms include intense fatigue with continual lack of energy, depression, poor concentration and memory, school failure, acting out dreams, or sleep eating.    Although it affects 1 in every 2000 people, it is often misdiagnosed as psychiatric disorder (depression, ADHD, etc), and can lead to reduced sleep quality, reduced quality of life, and delays in treatment. It affects males and females equally and usually develops during adolescence with most people having first symptoms between age 15 to 30.  However, symptoms of narcolepsy can occur at any age. Based on research evidence, narcolepsy is most commonly caused by decreased amount of brain chemicals called Hypocretin (also known as orexin) which, in turn, is due to loss of  hypocretin-producing cells in the brain by autoimmune process.       Treatment of narcolepsy involves medications, lifestyle changes, and educating other people.     Medications: One or more medications are usually prescribed to control the symptoms of narcolepsy. The excessive daytime sleepiness is treated with stimulant while cataplexy is treated with anti-depressants.   Lifestyle changes  Try to plan and schedule 2-3 brief naps (~10-15 minutes) in strategic times during the day to control daytime sleepiness and reduce the number of unplanned sleep attacks. Inform teachers or work supervisors about the condition and hopefully, nap accommodations can be fulfilled.   Avoid alcohol and sedative-hypnotic use as these substances may exacerbate sleepiness.   Avoid caffeine in late afternoon or early evening so that sleep at night is not disturbed.   Maintain and follow a strict and regular sleep-wake schedule that ensures adequate sleep.   Practice good sleep hygiene and shift work hygiene, if the latter is applicable  Exposure self to bright light in daytime as bright light can be STIMULATING.  Exercise daily in daytime as it can be beneficial and STIMULATING. Increase physical activity by getting up and walking around often to reduce urge to sleep. Avoid boring or repetitive tasks.   Avoid driving vehicle or operating heavy machinery when sleepy until daytime sleepiness is well-treated. A person driving while sleepy is 5 times more likely to have an accident. If you feel sleepy, pull over and take a short power nap (sleep for less than 30 minutes). Otherwise, ask somebody to drive you.    Narcolepsy itself is not deadly but it may be dangerous if episodes occur during driving, operating machinery, or similar activities. Possible issues and complications with narcolepsy include difficulty with social activities, injuries and accidents if sleep attacks occur during the activity, and side effects of medications used to  treat the disorder.

## 2024-01-17 NOTE — PROGRESS NOTES
CONTROLLED SUBSTANCE HPI  Current medications: Adderall  Any side effects: from current meds: not in the past, has been off for a about a year    OARRS:   I have personally reviewed the OARRS report for Benja Luis. I have considered the risks of abuse, dependence, addiction and diversion, I believe that it is clinically appropriate for Benja Luis to be prescribed this medication, and I have the following concerns previous issues with accidental overdose on pain medication. She is currently on tramadol which was the cause of overdose. Will monitor closely.     Is the patient prescribed a combination of benzodiazepine and opioid? No    Controlled substance agreement:   Date of the last agreement: 01/17/24   Renewed controlled substance agreement today: Yes   Reviewed Controlled Substance Agreement including but not limited to the benefits, risks, and alternatives to treatment with a Controlled Substance medication(s).    Date of last urine drug screen: ordered today   Results of last urine drug screen: 11/2023 + for amphetamines  Ordered urine drug screen today: Yes         Stimulants:   What is the patient's goal of therapy? Stay awake during the day, known history of narcolepsy  Is this being achieved with current treatment? yes    Activities of Daily Living:   Is your overall impression that this patient is benefiting (symptom reduction outweighs side effects) from stimulant therapy? Yes     1. Physical Functioning: Same  2. Family Relationship: Same  3. Social Relationship: Same  4. Mood: Same  5. Sleep Patterns: Same  6. Overall Function: Same

## 2024-02-02 ENCOUNTER — APPOINTMENT (OUTPATIENT)
Dept: RADIOLOGY | Facility: HOSPITAL | Age: 54
End: 2024-02-02
Payer: MEDICARE

## 2024-02-02 ENCOUNTER — HOSPITAL ENCOUNTER (OUTPATIENT)
Dept: RADIOLOGY | Facility: HOSPITAL | Age: 54
Discharge: HOME | End: 2024-02-02
Payer: MEDICARE

## 2024-02-02 ENCOUNTER — APPOINTMENT (OUTPATIENT)
Dept: CARDIOLOGY | Facility: HOSPITAL | Age: 54
End: 2024-02-02
Payer: MEDICARE

## 2024-02-02 ENCOUNTER — HOSPITAL ENCOUNTER (EMERGENCY)
Facility: HOSPITAL | Age: 54
Discharge: HOME | End: 2024-02-02
Attending: FAMILY MEDICINE
Payer: MEDICARE

## 2024-02-02 VITALS
WEIGHT: 147 LBS | OXYGEN SATURATION: 100 % | HEIGHT: 64 IN | DIASTOLIC BLOOD PRESSURE: 88 MMHG | SYSTOLIC BLOOD PRESSURE: 126 MMHG | HEART RATE: 84 BPM | TEMPERATURE: 97.5 F | RESPIRATION RATE: 16 BRPM | BODY MASS INDEX: 25.1 KG/M2

## 2024-02-02 DIAGNOSIS — Z87.19 HISTORY OF ESOPHAGITIS: ICD-10-CM

## 2024-02-02 DIAGNOSIS — N30.00 ACUTE CYSTITIS WITHOUT HEMATURIA: ICD-10-CM

## 2024-02-02 DIAGNOSIS — M25.512 LEFT SHOULDER PAIN, UNSPECIFIED CHRONICITY: ICD-10-CM

## 2024-02-02 DIAGNOSIS — M79.671 PAIN IN RIGHT FOOT: ICD-10-CM

## 2024-02-02 DIAGNOSIS — R07.9 CHEST PAIN, UNSPECIFIED TYPE: Primary | ICD-10-CM

## 2024-02-02 DIAGNOSIS — M96.0 PSEUDARTHROSIS AFTER FUSION OR ARTHRODESIS: ICD-10-CM

## 2024-02-02 DIAGNOSIS — K44.9 HIATAL HERNIA: ICD-10-CM

## 2024-02-02 LAB
ALBUMIN SERPL BCP-MCNC: 4 G/DL (ref 3.4–5)
ALP SERPL-CCNC: 94 U/L (ref 33–110)
ALT SERPL W P-5'-P-CCNC: 12 U/L (ref 7–45)
AMORPH CRY #/AREA UR COMP ASSIST: ABNORMAL /HPF
ANION GAP SERPL CALC-SCNC: 12 MMOL/L (ref 10–20)
APPEARANCE UR: ABNORMAL
AST SERPL W P-5'-P-CCNC: 18 U/L (ref 9–39)
ATRIAL RATE: 81 BPM
ATRIAL RATE: 86 BPM
BACTERIA #/AREA URNS AUTO: ABNORMAL /HPF
BASOPHILS # BLD AUTO: 0.05 X10*3/UL (ref 0–0.1)
BASOPHILS NFR BLD AUTO: 0.9 %
BILIRUB SERPL-MCNC: 0.2 MG/DL (ref 0–1.2)
BILIRUB UR STRIP.AUTO-MCNC: NEGATIVE MG/DL
BUN SERPL-MCNC: 22 MG/DL (ref 6–23)
CALCIUM SERPL-MCNC: 8.8 MG/DL (ref 8.6–10.3)
CARDIAC TROPONIN I PNL SERPL HS: 3 NG/L (ref 0–13)
CARDIAC TROPONIN I PNL SERPL HS: 3 NG/L (ref 0–13)
CHLORIDE SERPL-SCNC: 106 MMOL/L (ref 98–107)
CO2 SERPL-SCNC: 26 MMOL/L (ref 21–32)
COLOR UR: YELLOW
CREAT SERPL-MCNC: 0.81 MG/DL (ref 0.5–1.05)
D DIMER PPP FEU-MCNC: 448 NG/ML FEU
EGFRCR SERPLBLD CKD-EPI 2021: 87 ML/MIN/1.73M*2
EOSINOPHIL # BLD AUTO: 0.41 X10*3/UL (ref 0–0.7)
EOSINOPHIL NFR BLD AUTO: 7 %
ERYTHROCYTE [DISTWIDTH] IN BLOOD BY AUTOMATED COUNT: 15.3 % (ref 11.5–14.5)
GLUCOSE SERPL-MCNC: 84 MG/DL (ref 74–99)
GLUCOSE UR STRIP.AUTO-MCNC: NEGATIVE MG/DL
HCT VFR BLD AUTO: 33 % (ref 36–46)
HGB BLD-MCNC: 10 G/DL (ref 12–16)
IMM GRANULOCYTES # BLD AUTO: 0.02 X10*3/UL (ref 0–0.7)
IMM GRANULOCYTES NFR BLD AUTO: 0.3 % (ref 0–0.9)
KETONES UR STRIP.AUTO-MCNC: NEGATIVE MG/DL
LEUKOCYTE ESTERASE UR QL STRIP.AUTO: ABNORMAL
LYMPHOCYTES # BLD AUTO: 2.02 X10*3/UL (ref 1.2–4.8)
LYMPHOCYTES NFR BLD AUTO: 34.6 %
MAGNESIUM SERPL-MCNC: 2.2 MG/DL (ref 1.6–2.4)
MCH RBC QN AUTO: 25 PG (ref 26–34)
MCHC RBC AUTO-ENTMCNC: 30.3 G/DL (ref 32–36)
MCV RBC AUTO: 83 FL (ref 80–100)
MONOCYTES # BLD AUTO: 0.71 X10*3/UL (ref 0.1–1)
MONOCYTES NFR BLD AUTO: 12.2 %
MUCOUS THREADS #/AREA URNS AUTO: ABNORMAL /LPF
NEUTROPHILS # BLD AUTO: 2.63 X10*3/UL (ref 1.2–7.7)
NEUTROPHILS NFR BLD AUTO: 45 %
NITRITE UR QL STRIP.AUTO: NEGATIVE
NRBC BLD-RTO: 0 /100 WBCS (ref 0–0)
P AXIS: 52 DEGREES
P AXIS: 65 DEGREES
P OFFSET: 209 MS
P OFFSET: 210 MS
P ONSET: 145 MS
P ONSET: 158 MS
PH UR STRIP.AUTO: 7 [PH]
PLATELET # BLD AUTO: 421 X10*3/UL (ref 150–450)
POTASSIUM SERPL-SCNC: 3.5 MMOL/L (ref 3.5–5.3)
PR INTERVAL: 126 MS
PR INTERVAL: 154 MS
PROT SERPL-MCNC: 7 G/DL (ref 6.4–8.2)
PROT UR STRIP.AUTO-MCNC: NEGATIVE MG/DL
Q ONSET: 221 MS
Q ONSET: 222 MS
QRS COUNT: 14 BEATS
QRS COUNT: 14 BEATS
QRS DURATION: 88 MS
QRS DURATION: 88 MS
QT INTERVAL: 390 MS
QT INTERVAL: 412 MS
QTC CALCULATION(BAZETT): 466 MS
QTC CALCULATION(BAZETT): 478 MS
QTC FREDERICIA: 440 MS
QTC FREDERICIA: 455 MS
R AXIS: 29 DEGREES
R AXIS: 47 DEGREES
RBC # BLD AUTO: 4 X10*6/UL (ref 4–5.2)
RBC # UR STRIP.AUTO: NEGATIVE /UL
RBC #/AREA URNS AUTO: ABNORMAL /HPF
SODIUM SERPL-SCNC: 140 MMOL/L (ref 136–145)
SP GR UR STRIP.AUTO: 1.02
SQUAMOUS #/AREA URNS AUTO: ABNORMAL /HPF
T AXIS: 51 DEGREES
T AXIS: 58 DEGREES
T OFFSET: 416 MS
T OFFSET: 428 MS
UROBILINOGEN UR STRIP.AUTO-MCNC: <2 MG/DL
VENTRICULAR RATE: 81 BPM
VENTRICULAR RATE: 86 BPM
WBC # BLD AUTO: 5.8 X10*3/UL (ref 4.4–11.3)
WBC #/AREA URNS AUTO: ABNORMAL /HPF

## 2024-02-02 PROCEDURE — 73700 CT LOWER EXTREMITY W/O DYE: CPT | Mod: RIGHT SIDE | Performed by: STUDENT IN AN ORGANIZED HEALTH CARE EDUCATION/TRAINING PROGRAM

## 2024-02-02 PROCEDURE — 73700 CT LOWER EXTREMITY W/O DYE: CPT | Mod: RT

## 2024-02-02 PROCEDURE — 93005 ELECTROCARDIOGRAM TRACING: CPT

## 2024-02-02 PROCEDURE — 93005 ELECTROCARDIOGRAM TRACING: CPT | Mod: 76

## 2024-02-02 PROCEDURE — 85025 COMPLETE CBC W/AUTO DIFF WBC: CPT | Performed by: FAMILY MEDICINE

## 2024-02-02 PROCEDURE — 81001 URINALYSIS AUTO W/SCOPE: CPT | Performed by: FAMILY MEDICINE

## 2024-02-02 PROCEDURE — 99283 EMERGENCY DEPT VISIT LOW MDM: CPT | Mod: 25

## 2024-02-02 PROCEDURE — 84484 ASSAY OF TROPONIN QUANT: CPT | Mod: 91 | Performed by: FAMILY MEDICINE

## 2024-02-02 PROCEDURE — 99284 EMERGENCY DEPT VISIT MOD MDM: CPT | Performed by: FAMILY MEDICINE

## 2024-02-02 PROCEDURE — 73030 X-RAY EXAM OF SHOULDER: CPT | Mod: LT

## 2024-02-02 PROCEDURE — 83735 ASSAY OF MAGNESIUM: CPT | Performed by: FAMILY MEDICINE

## 2024-02-02 PROCEDURE — 85379 FIBRIN DEGRADATION QUANT: CPT | Performed by: FAMILY MEDICINE

## 2024-02-02 PROCEDURE — 80053 COMPREHEN METABOLIC PANEL: CPT | Performed by: FAMILY MEDICINE

## 2024-02-02 PROCEDURE — 73030 X-RAY EXAM OF SHOULDER: CPT | Mod: LEFT SIDE | Performed by: STUDENT IN AN ORGANIZED HEALTH CARE EDUCATION/TRAINING PROGRAM

## 2024-02-02 PROCEDURE — 71046 X-RAY EXAM CHEST 2 VIEWS: CPT | Performed by: STUDENT IN AN ORGANIZED HEALTH CARE EDUCATION/TRAINING PROGRAM

## 2024-02-02 PROCEDURE — 71046 X-RAY EXAM CHEST 2 VIEWS: CPT

## 2024-02-02 PROCEDURE — 36415 COLL VENOUS BLD VENIPUNCTURE: CPT | Performed by: FAMILY MEDICINE

## 2024-02-02 PROCEDURE — 84484 ASSAY OF TROPONIN QUANT: CPT | Performed by: FAMILY MEDICINE

## 2024-02-02 PROCEDURE — 2500000001 HC RX 250 WO HCPCS SELF ADMINISTERED DRUGS (ALT 637 FOR MEDICARE OP): Mod: SE | Performed by: FAMILY MEDICINE

## 2024-02-02 RX ORDER — TRAMADOL HYDROCHLORIDE 50 MG/1
50 TABLET ORAL ONCE
Status: COMPLETED | OUTPATIENT
Start: 2024-02-02 | End: 2024-02-02

## 2024-02-02 RX ORDER — NITROFURANTOIN 25; 75 MG/1; MG/1
100 CAPSULE ORAL 2 TIMES DAILY
Qty: 10 CAPSULE | Refills: 0 | Status: SHIPPED | OUTPATIENT
Start: 2024-02-02 | End: 2024-02-07

## 2024-02-02 RX ADMIN — TRAMADOL HYDROCHLORIDE 50 MG: 50 TABLET, COATED ORAL at 10:02

## 2024-02-02 ASSESSMENT — PAIN DESCRIPTION - DIRECTION: RADIATING_TOWARDS: SHOULDER AND ARM

## 2024-02-02 ASSESSMENT — PAIN DESCRIPTION - PAIN TYPE: TYPE: ACUTE PAIN

## 2024-02-02 ASSESSMENT — PAIN DESCRIPTION - LOCATION: LOCATION: CHEST

## 2024-02-02 ASSESSMENT — PAIN - FUNCTIONAL ASSESSMENT
PAIN_FUNCTIONAL_ASSESSMENT: 0-10
PAIN_FUNCTIONAL_ASSESSMENT: 0-10

## 2024-02-02 ASSESSMENT — COLUMBIA-SUICIDE SEVERITY RATING SCALE - C-SSRS
6. HAVE YOU EVER DONE ANYTHING, STARTED TO DO ANYTHING, OR PREPARED TO DO ANYTHING TO END YOUR LIFE?: NO
2. HAVE YOU ACTUALLY HAD ANY THOUGHTS OF KILLING YOURSELF?: NO
1. IN THE PAST MONTH, HAVE YOU WISHED YOU WERE DEAD OR WISHED YOU COULD GO TO SLEEP AND NOT WAKE UP?: NO

## 2024-02-02 ASSESSMENT — PAIN DESCRIPTION - PROGRESSION: CLINICAL_PROGRESSION: NOT CHANGED

## 2024-02-02 ASSESSMENT — PAIN DESCRIPTION - ONSET: ONSET: ONGOING

## 2024-02-02 ASSESSMENT — PAIN DESCRIPTION - FREQUENCY: FREQUENCY: INTERMITTENT

## 2024-02-02 ASSESSMENT — PAIN DESCRIPTION - ORIENTATION: ORIENTATION: LEFT

## 2024-02-02 ASSESSMENT — PAIN SCALES - GENERAL
PAINLEVEL_OUTOF10: 4
PAINLEVEL_OUTOF10: 7
PAINLEVEL_OUTOF10: 7

## 2024-02-02 ASSESSMENT — PAIN DESCRIPTION - DESCRIPTORS: DESCRIPTORS: ACHING

## 2024-02-02 NOTE — ED PROVIDER NOTES
HPI   Chief Complaint   Patient presents with    Chest Pain     Cp and left shoulder arm pain 7/10 aching going on for years       HPI  53-year-old female patient was sent to the ER from radiology department where she initially reported for the CT of the right foot and ankle suspected osteomyelitis.  However when she was in radiology department she starts rinsing chest pain and left shoulder pain pain however also reproducible and later on when I talked to her in detail said that she has been in shoulder pain since yesterday she has not any chest pain today pain is worse with movement of the arm of the shoulder joint area.  She denies any pain with a deep breath.  Denies any dizziness lightheaded palpitation cold sweats blackout or pass out.  Denies any vomiting or diarrhea.  She also is being referred to infectious disease by dermatologist first scalp lesion which she discovered MRSA.  However she has not been taking any antibiotic for scalp lesion now which was already treated by dermatologist.  Patient denies any nausea vomiting or diarrhea.  Denies any pain or swelling legs.  She has remote history of DVT when she was treated Coumadin was held Coumadin treatment has been completed and discontinued.    Family history: Reviewed social  History: Reviewed, denies substance abuse.      Review of systems 10 review of system obtained review of system as described in HPI otherwise negative.             No data recorded                Patient History   Past Medical History:   Diagnosis Date    Cervicalgia     Neck pain    Chronic sinusitis, unspecified     Sinusitis    Other cervical disc degeneration, unspecified cervical region     Degeneration of cervical intervertebral disc    Other conditions influencing health status     Urinary Tract Infection    Personal history of (healed) traumatic fracture     History of fracture of rib    Personal history of other diseases of the musculoskeletal system and connective tissue      History of osteomyelitis    Personal history of other diseases of the musculoskeletal system and connective tissue     History of low back pain    Personal history of other diseases of the musculoskeletal system and connective tissue     History of radiculopathy    Personal history of other infectious and parasitic diseases     History of sepsis    Personal history of other mental and behavioral disorders     History of depression    Personal history of other specified conditions     History of fatigue     Past Surgical History:   Procedure Laterality Date    CARPAL TUNNEL RELEASE  09/19/2013    Neuroplasty Median Nerve At Carpal Tunnel    COLONOSCOPY  09/19/2013    Complete Colonoscopy    HYSTERECTOMY  09/19/2013    Hysterectomy    OTHER SURGICAL HISTORY  09/19/2013    Ant Spinal Diskect Osteophytect Lumb Interspace Microdiscect     Family History   Problem Relation Name Age of Onset    Other (Back pain) Other       Social History     Tobacco Use    Smoking status: Never    Smokeless tobacco: Never   Substance Use Topics    Alcohol use: Not on file    Drug use: Not on file       Physical Exam   ED Triage Vitals [02/02/24 0740]   Temperature Heart Rate Respirations BP   36.4 °C (97.5 °F) 87 16 111/82      Pulse Ox Temp Source Heart Rate Source Patient Position   100 % Temporal Monitor Sitting      BP Location FiO2 (%)     Left arm --       Physical Exam    CONSTITUTIONAL: Patient is awake alert but somewhat panicking female talking breathing without acute distress.  Alert oriented x 3.  No distress noted.  Breathing comfortably.  HENMT: The airway was intact. There was no ear or nose discharge. No drooling or stridor. Neck was supple. Talking and breathing comfortably.  EYES: Clear bilaterally, pupils equal, round and reactive to light. CARDIOVASCULAR: Regular rate and rhythm. No friction rub or murmur good peripheral pulses no peripheral edema. Nontender Homans sign negative.  She was noted to have some  left-sided chest wall tenderness chest wall pain with range of motion of left shoulder joint but range of motion shoulder joint normal.  No crepitation subcutaneous emphysema.  No cyanosis good skin perfusion calf ulcer nontender Homans' sign negative.      RESPIRATORY: Patient was breathing comfortably. No tachypnea no respiratory distress.  On auscultation he has diminished breath sounds crackles in the lung bases.  However has good skin perfusion.   Range of motion of the left shoulder cause pain palpation of the left shoulder cause pain but no deformity noted GASTROINTESTINAL: Abdomen soft positive bowel sounds noted right lower quadrant tenderness no guarding, rebound surgery no CVA tenderness 20 no pulsatile mass.   GENITOURINARY:  No costovertebral angle tenderness. MUSCULOSKELETAL: She has good motion shoulder with discharge.  Good handgrip intact radial pulses also tenderness upon palpation of the left anterior chest wall close to the shoulder area.  No crepitation subcutaneous emphysema intact radial pulse intact sensation.  Cervical, thoracic lumbar spine nontender neck is supple.  No facial bruise edema or erythema noted.  Head was normocephalic atraumatic..good initial motion of both upper lower extremities.  Intact distal pulse intact sensation spine nontender neck was supple.      NEUROLOGICAL: Awake alert pleasant and cooperative. No motor or sensory deficit no arms selective noted. Intact neurovascular function and motor function. No facial drooping or drooling. Talking and breathing comfortably.  Cranial nerves II to XII grossly intact. No arms selective noted. No nystagmus.  SKIN: Skin normal color for race, warm, dry and intact. No evidence of trauma Noted to have a small abrasion to have a scab lesion noted being evaluated for MRSA already evaluated by dermatologist waiting for infectious disease consult    PSYCHIATRIC: Patient appears somewhat anxious but talking breathing comfortably.Awake  alert and without acute distress. No obvious depression, no suicidal thoughts or ideation. Appropriate mood. Talking and normal tone.Prior history of depression and anxiety currently slight anxiety but no major depression symptoms.  No suicidal thoughts or homicidal thoughts.     HEME/LYMPH: No adenopathy or splenomegaly.        CRITICAL CARE    VITAL SIGNS:       heart rate 60 respiratory 19 blood pressure 152/71 pulse ox 92% on O2 through nasal cannula           DISPOSITION      ED Course & MDM   Diagnoses as of 02/02/24 1258   Chest pain, unspecified type   Left shoulder pain, unspecified chronicity   Hiatal hernia   History of esophagitis   Acute cystitis without hematuria     EKG obtained at 745 hours showed normal sinus rhythm rate 86 normal DC interval normal axis, complex no ST-T evaluation.  No STEMI.  Normal EKG.  Read this EKG.    Second EKG done at 1012 hrs. showed normal sinus rhythm rate of 81 normal DC interval normal  Normal QRS complex no ST-T evaluation.  No STEMI.  Normal EKG DC at this EKG      Medical Decision Making  This 53-year female patient with history of depression anxiety disorder she was admitted her for medical care to was scheduled to have a CT of the foot and ankle for suspected osteomyelitis while in the radiology she started experiencing chest pain and she was wheeled into our ER for evaluation.  She complained of chest discomfort and shoulder pain but also complained of pain with the palpation chest wall with range of motion shoulder joint without any new trauma fall injury.  Denies any weakness handgrip or numbness in the fingers.  She does a history of atrial hernia and esophagitis that we found her later in the course of her care.    On examination she appeared anxious but did not look sick toxic distress or resting the exam is unremarkable neck is supple lungs are clear heart regular rate and rhythm lungs auscultated she had some tenderness upon palpation chest wall and also  pain with range of motion shoulder joint with good range of motion shoulder elbow wrist joint good handgrip intact radial pulses.  Lungs are clear.  No tachypnea hypoxemia respite stress.  Abdomen soft and nontender.  Rest of MSK examination within normal admit examination within normal range.    EKG showed no ST-T wave elevation.  No STEMI.  Normal EKG 2 EKGs were done they were both normal.  2 set of troponin negative.  D-dimer was within normal chest x-ray showed findings suspicious is suggestive of hiatal hernia and she does have prior history of ventral hernia.  X-ray of the shoulder showed no fracture or subluxation.  CT imaging study report.  Patient feels like she is ready to go home.  Urinalysis suspicious for UTI no fever or chills or nausea vomiting, patient was put on Macrobid.  Culture pending     patient was subsequent discharged to follow cardiologist Dr. Ray as well as primary care physician if any problem concern return to ER no symptoms at the time of discharge.  Clinical stable and understood instruction well    Procedure  Procedures     Jayde Ward MD  02/02/24 0891       Jayde Ward MD  02/02/24 1252       Jayde Ward MD  02/02/24 1251

## 2024-02-02 NOTE — DISCHARGE INSTRUCTIONS
Since she had negative stress test and your 2 set of troponin negative also blood test was negative for blood clot chest x-ray feels well with your clinical history of hiatal hernia.  Follow-up with your primary care physician.  Also recommended follow cardiologist.  If any problem concern return to ER.

## 2024-02-02 NOTE — ED NOTES
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

## 2024-02-06 ENCOUNTER — APPOINTMENT (OUTPATIENT)
Dept: SLEEP MEDICINE | Facility: CLINIC | Age: 54
End: 2024-02-06
Payer: MEDICARE

## 2024-02-07 ENCOUNTER — APPOINTMENT (OUTPATIENT)
Dept: SLEEP MEDICINE | Facility: CLINIC | Age: 54
End: 2024-02-07
Payer: MEDICARE

## 2024-02-15 DIAGNOSIS — G47.419 PRIMARY NARCOLEPSY WITHOUT CATAPLEXY (HHS-HCC): ICD-10-CM

## 2024-02-16 DIAGNOSIS — G47.419 PRIMARY NARCOLEPSY WITHOUT CATAPLEXY (HHS-HCC): ICD-10-CM

## 2024-02-19 RX ORDER — DEXTROAMPHETAMINE SACCHARATE, AMPHETAMINE ASPARTATE, DEXTROAMPHETAMINE SULFATE AND AMPHETAMINE SULFATE 2.5; 2.5; 2.5; 2.5 MG/1; MG/1; MG/1; MG/1
10 TABLET ORAL 3 TIMES DAILY
Qty: 90 TABLET | Refills: 0 | OUTPATIENT
Start: 2024-02-19 | End: 2024-03-20

## 2024-02-20 DIAGNOSIS — G47.419 PRIMARY NARCOLEPSY WITHOUT CATAPLEXY (HHS-HCC): ICD-10-CM

## 2024-02-22 ENCOUNTER — LAB (OUTPATIENT)
Dept: LAB | Facility: LAB | Age: 54
End: 2024-02-22
Payer: MEDICARE

## 2024-02-22 ENCOUNTER — HOSPITAL ENCOUNTER (OUTPATIENT)
Dept: RADIOLOGY | Facility: CLINIC | Age: 54
Discharge: HOME | End: 2024-02-22
Payer: MEDICARE

## 2024-02-22 DIAGNOSIS — Z79.899 MEDICATION MANAGEMENT: ICD-10-CM

## 2024-02-22 DIAGNOSIS — K59.04 CHRONIC IDIOPATHIC CONSTIPATION: ICD-10-CM

## 2024-02-22 DIAGNOSIS — G47.419 PRIMARY NARCOLEPSY WITHOUT CATAPLEXY (HHS-HCC): ICD-10-CM

## 2024-02-22 PROCEDURE — 80307 DRUG TEST PRSMV CHEM ANLYZR: CPT

## 2024-02-22 PROCEDURE — 74018 RADEX ABDOMEN 1 VIEW: CPT | Performed by: RADIOLOGY

## 2024-02-22 PROCEDURE — 80324 DRUG SCREEN AMPHETAMINES 1/2: CPT

## 2024-02-22 PROCEDURE — 74018 RADEX ABDOMEN 1 VIEW: CPT

## 2024-02-23 LAB
AMPHETAMINES UR QL SCN: ABNORMAL
BARBITURATES UR QL SCN: ABNORMAL
BENZODIAZ UR QL SCN: ABNORMAL
BZE UR QL SCN: ABNORMAL
CANNABINOIDS UR QL SCN: ABNORMAL
FENTANYL+NORFENTANYL UR QL SCN: ABNORMAL
OPIATES UR QL SCN: ABNORMAL
OXYCODONE+OXYMORPHONE UR QL SCN: ABNORMAL
PCP UR QL SCN: ABNORMAL

## 2024-02-23 RX ORDER — DEXTROAMPHETAMINE SACCHARATE, AMPHETAMINE ASPARTATE, DEXTROAMPHETAMINE SULFATE AND AMPHETAMINE SULFATE 2.5; 2.5; 2.5; 2.5 MG/1; MG/1; MG/1; MG/1
30 TABLET ORAL DAILY
Qty: 90 TABLET | Refills: 0 | Status: SHIPPED | OUTPATIENT
Start: 2024-02-23 | End: 2024-02-28 | Stop reason: SDUPTHER

## 2024-02-23 NOTE — PROGRESS NOTES
[facility] RADIATION ONCOLOGY  [address] [address]  Dept Phone: [phone]    Radiation Oncology On Treatment Visit Note    Patient Name:  Ric Carroll  YOB: 1952  MRN:  [id number]  Treating Physician:  Martin Hernandez MD    Diagnosis:  Malignant neoplasm of prostate (CMS/HCC) C61    Cancer Staging  Malignant neoplasm of prostate (CMS/HCC)  Staging form: Prostate, AJCC 8th Edition  - Pathologic: Stage IIIC (pT3b, pN0, cM0, Grade Group: 5) - Signed by Martin Hernandez MD on 4/20/2022    Total planned dose 7020 cGy in 39 fractions   Prostate bed 6300 cGy in 35 fractions   Prostate bed boost 720 cGy in 4 fractions    Radiation Treatments     Active   Boost (Started on 7/14/2022)   Most recent fraction: 180 cGy given on 7/14/2022   Total given: 180 cGy / 720 cGy  (1 of 4 fractions)   Elapsed Days: 0   Technique: VMAT         Historical   Prostate Bed (Started on 5/24/2022)   Most recent fraction: 180 cGy given on 7/13/2022   Total given: 6,300 cGy / 6,300 cGy  (35 of 35 fractions)   Elapsed Days: 50   Technique: VMAT                   Ric Carroll was seen for on treatment visit. The current dose and brief summary of treatment are shown above. The patient is tolerating the treatment well without any complaints except for some mild increased urinary frequency and nocturia.  He denies any dysuria, hematuria, diarrhea or any other symptoms.  He is started on tamsulosin last week and he feels much better.    Vitals:    07/14/22 1402   BP: 123/84   BP Location: RUE - Right upper extremity   Patient Position: Sitting   Pulse: 95   Resp: 20   Temp: 98 °F (36.7 °C)   TempSrc: Temporal   SpO2: 98%   Weight: 88.6 kg (195 lb 5.2 oz)       ECOG Performance Status: 2-Ambulatory and capable of all selfcare but unable to carry out any work activities.  Up and about more than 50% of waking hours    Physical exam: Alert and oriented. Breathing comfortably.  Abdomen was benign.    Image / Setup Review:   Urine drug screen noted for medication. All other screen negative. Updated prescription.    Chart, Dosimetry, Images, and Setup Reviewed.    Impression / Plan: Tolerating the treatment well, overall. Continue as planned.  We will continue to monitor urinary symptoms.     Radiation Therapy Visit    Symptoms within expected range  Radiation dose schedule reviewed and remains acceptable  Dosimetry plan remains acceptable  Radiation technique remains acceptable  Films reviewed and remain acceptable  Patient setup reviewed and remains acceptable  Pain assessed  Pain management planned  Continue radiation therapy

## 2024-02-26 LAB
AMPHET UR-MCNC: >5000 NG/ML
MDA UR-MCNC: <200 NG/ML
MDEA UR-MCNC: <200 NG/ML
MDMA UR-MCNC: <200 NG/ML
METHAMPHET UR-MCNC: NORMAL NG/ML
PHENTERMINE UR CFM-MCNC: <200 NG/ML

## 2024-02-28 DIAGNOSIS — G47.419 PRIMARY NARCOLEPSY WITHOUT CATAPLEXY (HHS-HCC): ICD-10-CM

## 2024-02-28 RX ORDER — DEXTROAMPHETAMINE SACCHARATE, AMPHETAMINE ASPARTATE, DEXTROAMPHETAMINE SULFATE AND AMPHETAMINE SULFATE 2.5; 2.5; 2.5; 2.5 MG/1; MG/1; MG/1; MG/1
30 TABLET ORAL DAILY
Qty: 90 TABLET | Refills: 0 | Status: SHIPPED | OUTPATIENT
Start: 2024-02-28 | End: 2024-03-25 | Stop reason: SDUPTHER

## 2024-02-28 RX ORDER — DEXTROAMPHETAMINE SACCHARATE, AMPHETAMINE ASPARTATE, DEXTROAMPHETAMINE SULFATE AND AMPHETAMINE SULFATE 2.5; 2.5; 2.5; 2.5 MG/1; MG/1; MG/1; MG/1
30 TABLET ORAL DAILY
Qty: 90 TABLET | Refills: 0 | OUTPATIENT
Start: 2024-02-28 | End: 2024-03-29

## 2024-03-18 ENCOUNTER — APPOINTMENT (OUTPATIENT)
Dept: SLEEP MEDICINE | Facility: CLINIC | Age: 54
End: 2024-03-18
Payer: MEDICARE

## 2024-03-19 ENCOUNTER — APPOINTMENT (OUTPATIENT)
Dept: SLEEP MEDICINE | Facility: CLINIC | Age: 54
End: 2024-03-19
Payer: MEDICARE

## 2024-03-25 ENCOUNTER — TELEPHONE (OUTPATIENT)
Dept: PULMONOLOGY | Facility: CLINIC | Age: 54
End: 2024-03-25
Payer: MEDICARE

## 2024-03-25 DIAGNOSIS — G47.19 EXCESSIVE DAYTIME SLEEPINESS: ICD-10-CM

## 2024-03-25 DIAGNOSIS — R53.83 FATIGUE, UNSPECIFIED TYPE: ICD-10-CM

## 2024-03-25 DIAGNOSIS — G47.419 PRIMARY NARCOLEPSY WITHOUT CATAPLEXY (HHS-HCC): ICD-10-CM

## 2024-03-25 DIAGNOSIS — G47.30 SLEEP-DISORDERED BREATHING: Primary | ICD-10-CM

## 2024-03-25 RX ORDER — DEXTROAMPHETAMINE SACCHARATE, AMPHETAMINE ASPARTATE, DEXTROAMPHETAMINE SULFATE AND AMPHETAMINE SULFATE 2.5; 2.5; 2.5; 2.5 MG/1; MG/1; MG/1; MG/1
30 TABLET ORAL DAILY
Qty: 90 TABLET | Refills: 0 | Status: SHIPPED | OUTPATIENT
Start: 2024-03-25 | End: 2024-04-24 | Stop reason: SDUPTHER

## 2024-03-25 NOTE — TELEPHONE ENCOUNTER
Pt called asking for her adderal 10 mg rx   If ok to fill send to [facility]  [facility]   Has follow up apt with kenneth 4/11/24

## 2024-03-25 NOTE — PROGRESS NOTES
Patient missed her initial sleep study d/t reported car trouble. Sleep lab needed updated sleep study order. Order placed today. Communication also sent via [employer] to make sure patient completes her sleep logs two weeks prior to appointment. If not completed no testing.

## 2024-03-28 ENCOUNTER — TELEPHONE (OUTPATIENT)
Dept: SLEEP MEDICINE | Facility: CLINIC | Age: 54
End: 2024-03-28

## 2024-04-03 ENCOUNTER — APPOINTMENT (OUTPATIENT)
Dept: PULMONOLOGY | Facility: CLINIC | Age: 54
End: 2024-04-03
Payer: MEDICARE

## 2024-04-11 ENCOUNTER — APPOINTMENT (OUTPATIENT)
Dept: SLEEP MEDICINE | Facility: CLINIC | Age: 54
End: 2024-04-11
Payer: MEDICARE

## 2024-04-24 DIAGNOSIS — G47.419 PRIMARY NARCOLEPSY WITHOUT CATAPLEXY (HHS-HCC): ICD-10-CM

## 2024-04-24 RX ORDER — DEXTROAMPHETAMINE SACCHARATE, AMPHETAMINE ASPARTATE, DEXTROAMPHETAMINE SULFATE AND AMPHETAMINE SULFATE 2.5; 2.5; 2.5; 2.5 MG/1; MG/1; MG/1; MG/1
30 TABLET ORAL DAILY
Qty: 90 TABLET | Refills: 0 | Status: SHIPPED | OUTPATIENT
Start: 2024-04-24 | End: 2024-05-28

## 2024-05-07 ENCOUNTER — APPOINTMENT (OUTPATIENT)
Dept: SLEEP MEDICINE | Facility: CLINIC | Age: 54
End: 2024-05-07
Payer: MEDICARE

## 2024-05-08 ENCOUNTER — APPOINTMENT (OUTPATIENT)
Dept: SLEEP MEDICINE | Facility: CLINIC | Age: 54
End: 2024-05-08
Payer: MEDICARE

## 2024-05-13 ENCOUNTER — APPOINTMENT (OUTPATIENT)
Dept: SLEEP MEDICINE | Facility: CLINIC | Age: 54
End: 2024-05-13
Payer: MEDICARE

## 2024-05-21 ENCOUNTER — DOCUMENTATION (OUTPATIENT)
Dept: SLEEP MEDICINE | Facility: CLINIC | Age: 54
End: 2024-05-21
Payer: MEDICARE

## 2024-05-21 DIAGNOSIS — G47.419 PRIMARY NARCOLEPSY WITHOUT CATAPLEXY (HHS-HCC): ICD-10-CM

## 2024-05-21 RX ORDER — DEXTROAMPHETAMINE SACCHARATE, AMPHETAMINE ASPARTATE, DEXTROAMPHETAMINE SULFATE AND AMPHETAMINE SULFATE 2.5; 2.5; 2.5; 2.5 MG/1; MG/1; MG/1; MG/1
30 TABLET ORAL DAILY
Qty: 90 TABLET | Refills: 0 | OUTPATIENT
Start: 2024-05-21 | End: 2024-06-20

## 2024-05-21 NOTE — PROGRESS NOTES
Refill requested today in clinic for her stimulant. Patient has cancelled her last three scheduled appointments. At this time, she is in violation of CSA and no refills will be given till she is seen in clinic.

## 2024-05-23 DIAGNOSIS — G47.419 PRIMARY NARCOLEPSY WITHOUT CATAPLEXY (HHS-HCC): ICD-10-CM

## 2024-05-28 RX ORDER — DEXTROAMPHETAMINE SACCHARATE, AMPHETAMINE ASPARTATE, DEXTROAMPHETAMINE SULFATE AND AMPHETAMINE SULFATE 2.5; 2.5; 2.5; 2.5 MG/1; MG/1; MG/1; MG/1
30 TABLET ORAL DAILY
Qty: 90 TABLET | Refills: 0 | Status: SHIPPED | OUTPATIENT
Start: 2024-05-28 | End: 2024-06-10 | Stop reason: SDDI

## 2024-06-10 ENCOUNTER — APPOINTMENT (OUTPATIENT)
Dept: SLEEP MEDICINE | Facility: CLINIC | Age: 54
End: 2024-06-10
Payer: MEDICARE

## 2024-06-10 NOTE — PROGRESS NOTES
Patient: Benja Luis  : 1970 AGE: 53 y.o. SEX:female   MRN: [id number]   Provider: ALEXANDR [facility] [place]     Location [facility]   Service Date: 6/10/2024     PCP: David Singer MD   Referred by:               [facility]  FOLLOW-UP VISIT NOTE        HISTORY OF PRESENT ILLNESS     Patient ID: Benja Luis is a 53 y.o. female who presents to a [facility] for follow-up {C/C Sleep:60925}.    Patient is here {pxarrival:67213}.  The patient has pertinent hx of {brl pertinent hx:46548}.    Previous Visit's:        Interval History  Patient was last seen in ***.     06/10/24   {Interval sleep history:69199}        SLEEP HISTORY     SLEEP STUDY HISTORY  {Sleep study results:05025}    SLEEP-WAKE SCHEDULE  Bedtime: ***  Wake time: ***    SLEEP HABITS   Smoking: ***  ETOH: {YES NO DENIED:05522}  Marijuana: {YES NO DENIED:98533}  Caffeine: {YES NO DENIED:36328}  Sleep aids: ***     WEIGHT: {weight:59761}    REVIEW OF SYSTEMS     REVIEW OF SYSTEMS  SLEEP ROS   Review of Systems  {SLEEP ROS:14986:::1}     Psych Review of Symptoms       All other systems have been reviewed and are negative.      ALLERGIES     Allergies   Allergen Reactions    Penicillins Anaphylaxis, Hives and Swelling    Broccoli Unknown    Cabbage Unknown    Cauliflower Unknown    Diclofenac Unknown    Fluoxetine Unknown    Gabapentin Unknown    Meloxicam Unknown    Nalbuphine Unknown    Olive Extract Unknown    Onion Unknown    Oseltamivir Unknown    Peanut Unknown    Peas Unknown    Potato Unknown    Prochlorperazine Unknown    Sesame Seed Unknown    Soybean Unknown    Squash Unknown    Sulfa (Sulfonamide Antibiotics) Unknown    Sulfamethoxazole-Trimethoprim Unknown    Sunflower Seed Unknown    Ciprofloxacin Rash, Other and Unknown     Respiratory distress        MEDICATIONS     Current Outpatient Medications   Medication Sig Dispense Refill    albuterol  (ProAir HFA) 90 mcg/actuation inhaler Inhale 2 puffs every 4 hours if needed.      ammonium lactate (Amlactin) 12 % cream APPLY  AND RUB  IN A THIN FILM TO AFFECTED AREAS TWICE DAILY.(AM AND PM).      amphetamine-dextroamphetamine (Adderall) 10 mg tablet Take 3 tablets (30 mg) by mouth once daily. 90 tablet 0    CLINDAMYCIN HCL ORAL Take by mouth.      diphenoxylate-atropine (LomotiL) 2.5-0.025 mg tablet Take 1 tablet by mouth 4 times a day as needed.      ergocalciferol (Vitamin D-2) 1.25 MG (82150 UT) capsule Take 1 capsule (1,250 mcg) by mouth 1 (one) time per week.      esomeprazole (NexIUM) 40 mg DR capsule Take 1 capsule (40 mg) by mouth once daily.      fluticasone (Flonase) 50 mcg/actuation nasal spray Administer 2 sprays into each nostril once daily.      fluticasone furoate-vilanteroL (Breo Ellipta) 100-25 mcg/dose inhaler Inhale 1 puff once daily.      hydrocortisone acetate 0.5 % cream Externally      hydrOXYzine pamoate (Vistaril) 25 mg capsule Take 1 capsule (25 mg) by mouth every 8 hours if needed for itching or anxiety.      menthol (Biofreeze, menthol,) 4 % gel Apply topically to affected area 4 times a day, As Needed      mesalamine (Pentasa) 500 mg ER capsule Take 2 capsules (1,000 mg) by mouth 4 times a day.      pancrelipase, Lip-Prot-Amyl, (Creon) 36,000-114,000- 180,000 unit capsule,delayed release(DR/EC) capsule as directed Orally      pregabalin (Lyrica) 50 mg capsule Take 1 capsule (50 mg) by mouth 2 times a day.      promethazine (Phenergan) 25 mg tablet Take 1 tablet (25 mg) by mouth every 12 hours if needed.      sucralfate (Carafate) 1 gram tablet Take 1 tablet (1 g) by mouth 2 times a day. On an empty stomach      topiramate (Topamax) 100 mg tablet Take 1 tablet (100 mg) by mouth 2 times a day.      topiramate (Topamax) 25 mg tablet Take 1 tablet (25 mg) by mouth twice a day.       No current facility-administered medications for this visit.       PAST HISTORY     PERTINENT PAST  "MEDICAL HISTORY: See HPI    PERTINENT PAST SURGICAL HISTORY for Sleep Medicine:  {surgical history pertinent in sleep:53547}    PERTINENT FAMILY HISTORY for Sleep Medicine:  {family history in sleep:30703}    PERTINENT SOCIAL HISTORY:  She  reports that she has never smoked. She has been exposed to tobacco smoke. She has never used smokeless tobacco. She reports that she does not currently use alcohol. She reports that she does not use drugs. She currently lives {living status:10914} and {Employment status:31810}    Active Problems, Allergy List, Medication List, and PMH/PSH/FH/Social Hx have been reviewed and reconciled in chart. No significant changes unless documented in the pertinent chart section. Updates made when necessary.     PHYSICAL EXAM     VITAL SIGNS: There were no vitals taken for this visit.    CURRENT WEIGHT: There were no vitals filed for this visit.   BMI: There is no height or weight on file to calculate BMI.     PREVIOUS WEIGHTS:  Wt Readings from Last 3 Encounters:   24 66.7 kg (147 lb)   24 67.5 kg (148 lb 12.8 oz)   23 66.7 kg (147 lb)       Today ESS: ***  Today FAN: ***  Today TAMMI-7: ***   Today PHQ-2: {Postive or Negative Screen:86502}    PHYSICAL EXAMINATION  General appearance: awake alert in NAD  Affect: normal  Skin: no rash noted to face  HEENT: Nasal congestion absent  Teeth: normal dentition  Lungs: no cough.  Extr: moves all four extremities  Neuro: normal speech        RESULTS/DATA     No results found for: \"IRON\", \"TRANSFERRIN\", \"IRONSAT\", \"TIBC\", \"FERRITIN\"    Bicarbonate   Date Value Ref Range Status   2024 26 21 - 32 mmol/L Final       PAP Adherence  {PAP Download reviewed?:30975}    ASSESSMENT/PLAN     Assessment/Plan   Benja Luis is a 53 y.o. female presents today in [facility] with the following problems:    CURRENT DME: {DME choices:03905}    {HILDA dx:51530}, {Severity of HILDA:49532} ({HSAT/PS} AHI: " ***/hr)  s/p ***  currently on {Auto-CPAP settin}  {Compliance:12982} compliance to PAP therapy, residual AHI at goal, and good control of HILDA symptoms  - Patient's risk factors for HILDA: {HILDA risk factors:22022}  - Current symptoms are: see HPI  - HILDA diagnosed by {HSAT/PS} in ***. Reviewed sleep studies {review of sleep study today or previously:11171}. See HPI.  - Retrieved and personally reviewed recent PAP adherence download data today. See HPI.   - {PAP Compliance review:03445}  - Continue current PAP settings.   - Changed PAP settings to     - Due to high pre-test probability without significant medical comorbidity or possible concomitant sleep disorder, I recommend home sleep apnea testing to evaluate for HILDA.  - Due to ***, I recommend split-night PSG (Split if AHI>10) to evaluate for HILDA.  - Due to severity of sleep apnea and significant oxygen desaturation, I recommend PAP titration study to evaluate effective pressure settings to control HILDA.  - Due to sleep related hypoxemia, patient might have concomitant sleep-related hypoventilation. I recommend  PAP titration study with CO2 monitoring.    - I recommend starting auto-CPAP 5 -15 cm H2O with EPR 3, heated humidification, heated tubings, and mask used in sleep lab. Orders sent to {DME choices:88795}.   - I recommend starting auto-CPAP 5 -15 cm H2O with EPR 3, heated humidification, heated tubings, and mask fitting session. Orders sent to {DME choices:04308}.   - Discussed about 30-day mask guarantee and insurance requirement for PAP compliance follow-up.   - Supportive management: Lose weight. Stay off your back when sleeping. Avoid smoking, alcohol, and sedating medications if applicable. Don't drive when sleepy.    - Discussed HILDA pathophysiology, diagnostic testing (HST vs PSG), cardiometabolic and neurocognitive sequelae of untreated HILDA, and treatment options (PAP therapy, oral appliance, surgery, hypoglossal nerve stimulator called  INSPIRE, etc).    - I had face-to-face discussion with the patient about the need for PAP therapy to treat sleep apnea. Patient agreed with the plan and verbalized understanding.      {EDS FATIGUE SLEEP DISTURBANCE:60859}  - due to combination of {EDS causes:29804}. {EDS MEDS:69599}  - discussed with patient good sleep hygiene   - Important to get good daily dose of natural sunlight to help wakefulness & energy  - Reduce artificial lighting at night, especially light from screens (i.e. cellphones, TV, tablets)  - Exercise is helpful for your mental and physical health  - Have a consistent bedtime routine 1 hours prior to your usual bedtime  - If going to take a nap, it should be less than 30 minutes and prior to 3 pm  - Limit alcohol and caffeine use   - Avoidance of marijuana and/or CBD use  - will reevaluate after successful testing and treatment of HILDA     {Overweight/Obesity:21872}  - BMI today There is no height or weight on file to calculate BMI.   - no significant weight changes noted or reported  - Encouraged patient to lose weight with diet and exercise.   - Weight loss can help in the long term treatment of HILDA.  {Defer management:74061}      HYPERTENSION  - BP today ***  {BP management:57818}  - encouraged daily exercise with healthy diet for BP and HILDA management  - on meds per PCP  - Defer management to PCP    DEPRESSION / ANXIETY screen  - NEGATIVE SCREEN today 06/10/24      SMOKING STATUS screen  - {never or former smoker:81390}     All of patient's questions were answered. She verbalizes understanding and agreement with my assessment and plan.

## 2024-06-24 ENCOUNTER — APPOINTMENT (OUTPATIENT)
Dept: SLEEP MEDICINE | Facility: CLINIC | Age: 54
End: 2024-06-24
Payer: MEDICARE

## 2024-06-24 VITALS
WEIGHT: 143.8 LBS | DIASTOLIC BLOOD PRESSURE: 84 MMHG | SYSTOLIC BLOOD PRESSURE: 132 MMHG | BODY MASS INDEX: 24.68 KG/M2 | OXYGEN SATURATION: 97 % | HEART RATE: 86 BPM

## 2024-06-24 DIAGNOSIS — Z79.899 MEDICATION MANAGEMENT: ICD-10-CM

## 2024-06-24 DIAGNOSIS — G47.30 SLEEP-DISORDERED BREATHING: ICD-10-CM

## 2024-06-24 DIAGNOSIS — F31.60 BIPOLAR AFFECTIVE, MIXED (MULTI): ICD-10-CM

## 2024-06-24 DIAGNOSIS — G47.419 PRIMARY NARCOLEPSY WITHOUT CATAPLEXY (HHS-HCC): Primary | ICD-10-CM

## 2024-06-24 DIAGNOSIS — R53.83 FATIGUE, UNSPECIFIED TYPE: ICD-10-CM

## 2024-06-24 DIAGNOSIS — F41.1 GAD (GENERALIZED ANXIETY DISORDER): ICD-10-CM

## 2024-06-24 DIAGNOSIS — R29.818 SUSPECTED SLEEP APNEA: ICD-10-CM

## 2024-06-24 DIAGNOSIS — Z78.9 NEVER SMOKED TOBACCO: ICD-10-CM

## 2024-06-24 DIAGNOSIS — G47.19 EXCESSIVE DAYTIME SLEEPINESS: ICD-10-CM

## 2024-06-24 PROCEDURE — 3008F BODY MASS INDEX DOCD: CPT

## 2024-06-24 PROCEDURE — G2211 COMPLEX E/M VISIT ADD ON: HCPCS

## 2024-06-24 PROCEDURE — 99214 OFFICE O/P EST MOD 30 MIN: CPT

## 2024-06-24 ASSESSMENT — SLEEP AND FATIGUE QUESTIONNAIRES
SATISFACTION_WITH_CURRENT_SLEEP_PATTERN: VERY DISSATISFIED
HOW LIKELY ARE YOU TO NOD OFF OR FALL ASLEEP WHILE SITTING AND TALKING TO SOMEONE: HIGH CHANCE OF DOZING
ESS-CHAD TOTAL SCORE: 24
HOW LIKELY ARE YOU TO NOD OFF OR FALL ASLEEP WHILE SITTING AND READING: HIGH CHANCE OF DOZING
WAKING_TOO_EARLY: VERY SEVERE
SLEEP_PROBLEM_NOTICEABLE_TO_OTHERS: VERY MUCH NOTICEABLE
SITING INACTIVE IN A PUBLIC PLACE LIKE A CLASS ROOM OR A MOVIE THEATER: HIGH CHANCE OF DOZING
HOW LIKELY ARE YOU TO NOD OFF OR FALL ASLEEP WHILE SITTING QUIETLY AFTER LUNCH WITHOUT ALCOHOL: HIGH CHANCE OF DOZING
DIFFICULTY_STAYING_ASLEEP: VERY SEVERE
HOW LIKELY ARE YOU TO NOD OFF OR FALL ASLEEP IN A CAR, WHILE STOPPED FOR A FEW MINUTES IN TRAFFIC: HIGH CHANCE OF DOZING
WORRIED_DISTRESSED_DUE_TO_SLEEP: MUCH
HOW LIKELY ARE YOU TO NOD OFF OR FALL ASLEEP WHILE WATCHING TV: HIGH CHANCE OF DOZING
HOW LIKELY ARE YOU TO NOD OFF OR FALL ASLEEP WHEN YOU ARE A PASSENGER IN A CAR FOR AN HOUR WITHOUT A BREAK: HIGH CHANCE OF DOZING
HOW LIKELY ARE YOU TO NOD OFF OR FALL ASLEEP WHILE LYING DOWN TO REST IN THE AFTERNOON WHEN CIRCUMSTANCES PERMIT: HIGH CHANCE OF DOZING
SLEEP_PROBLEM_INTERFERES_DAILY_ACTIVITIES: NOT AT ALL NOTICEABLE

## 2024-06-24 ASSESSMENT — ANXIETY QUESTIONNAIRES
2. NOT BEING ABLE TO STOP OR CONTROL WORRYING: MORE THAN HALF THE DAYS
7. FEELING AFRAID AS IF SOMETHING AWFUL MIGHT HAPPEN: NOT AT ALL
GAD7 TOTAL SCORE: 9
6. BECOMING EASILY ANNOYED OR IRRITABLE: NOT AT ALL
5. BEING SO RESTLESS THAT IT IS HARD TO SIT STILL: NOT AT ALL
3. WORRYING TOO MUCH ABOUT DIFFERENT THINGS: MORE THAN HALF THE DAYS
1. FEELING NERVOUS, ANXIOUS, OR ON EDGE: NEARLY EVERY DAY
4. TROUBLE RELAXING: MORE THAN HALF THE DAYS

## 2024-06-24 ASSESSMENT — PATIENT HEALTH QUESTIONNAIRE - PHQ9
2. FEELING DOWN, DEPRESSED OR HOPELESS: NOT AT ALL
1. LITTLE INTEREST OR PLEASURE IN DOING THINGS: NOT AT ALL
SUM OF ALL RESPONSES TO PHQ9 QUESTIONS 1 AND 2: 0

## 2024-06-24 NOTE — PROGRESS NOTES
Patient: Benja Luis  : 1970 AGE: 53 y.o. SEX:female   MRN: [id number]   Provider: [facility]     Location [facility]   Service Date: 2024     PCP: David Singer MD   Referred by:               [facility]  FOLLOW-UP VISIT NOTE        HISTORY OF PRESENT ILLNESS     Patient ID: Benja Luis is a 53 y.o. female who presents to a [facility] for follow-up evaluation for sleep apnea, Narcolepsy, and Medication Management.    Patient is here alone today.  The patient has pertinent hx of Narcolepsy without cataplexy, inadequate sleep hygiene, EDS, fatigue, overweight, Asthma, psychiatric pseudoseizure, seasonal allergies, Bipolar, depression, anxiety, conversion disorder, RLS, Chrons disease, scoliosis, back pain, migraines, and chronic pain.     Previous Visit's:  24  Patient here today to get established for treatment of narcolepsy. Patient was a previous patient of Dr. Gonzalez, who has not seen the patient in over 1 year (last seen ). Patient has continued to take Adderall 20 mg TID. However, per [facility] report, she has not filled the prescription in 2022. Her most recent urine drug screen from [facility] was positive for amphetamines. She reports she is getting a prescription for 20 mg TID, which she has prescription bottle with her name and recent prescribed from [facility]. We had a lengthy discussion about stimulant and controlled substances. She will need to complete a urine drug screen prior to next refill of medication. I will start the patient on Adderall 10 mg BID and go from there. She is agreeable. CSA was signed today in clinic. Reviewed EKD from ED in 2023 QT/QTC was 394/465, most recent stress test and echo were WNL.     Known hx of narcolepsy. Patient has been treated in the past with Ritalin, did not help long-term, was switched to Adderall which has been helping up till a few  months ago. She started noticing she is falling asleep again during regular activities such as cooking dinner or talking with others. She has not tried Modafinil or Armodafinil, Wakix or Xywav. She is slightly familiar with Xywav as Dr. Gonzalez has suggested in the past.  She denied any symptoms of cataplexy. I do not have any testing from [facility], plan to start with PSG + MSLT. I did discuss with patient that if PSG is positive we will start on PAP therapy. If negative they will proceed with MSLT. She is currently on anti-depressants which her BiPolar is stable. Will not discontinue medications at this time. Continue with prescribed medications. Instructed patient to stop stimulant the day prior to her sleep study, resume after testing. Sleep log was given to patient today in clinic.     RLS symptoms controlled fairly well with medication. Not bothersome to the patient at this time. Does have chronic back and generalized body pains. Currently on Tramadol. Did have an accidental overdose in 7/2023 on Tramadol, she continues to be prescribed Tramadol since then with no other reported incidents. We discussed any illicit drug use or abuse of prescription will results in dismissal from practice as well as no prescription refills. She verbalized understanding. Plan to get urine drug screen before next refill.     Patient reports that most recently she has started to notice that she is awakening during the night gasping for air. Previous testing for HILDA was negative. Potential for HILDA, plan to start with PSG + MSLT. If PSG is positive, will start with PAP therapy first.       Interval History  Patient was last seen in 1/2024.     06/24/24   Patient here today for follow-up. She has not completed her sleep study yet. She has rescheduled numerous times d/t various reasons. She reports that she is scheduled for testing in August 2024. I encouraged the patient to follow-up and complete testing as prescribed.   I had a lengthy  "discussion with the patient that she has not follow-up in clinic as prescribed for her medication management for her stimulant. I had previously had a phone conversation with the patient about keeping her appointments for follow-up in regards to medication management. She continued to cancel appointments in clinic. At this time, she is in violation of this prescribers CSA and NO medication will be prescribed at this time till appropriate testing is completed. Once testing is completed, we can re-evaluate for medication management again. She verbalized understanding.     We discussed today that I will be leaving [facility] as of August 16th, 2024. I discussed follow-up plan with patient today. She will be transitioned to Dr. Adrián Reyes in [place]. His clinic and contact information was given to the patient in clinic. She verbalized understanding.    SLEEP HISTORY     SLEEP STUDY HISTORY  MSLT - 12/2021  Results - \"Four daytime nap studies were done according to standard protocol. Reduced mean sleep latency of 19 seconds and four sleep onset REM periods were observed. The study is consistent with narcolepsy\".       SLEEP HABITS   Smoking: denied  ETOH: DENIED  Marijuana: DENIED  Caffeine: DENIED  Sleep aids: denied     WEIGHT: stable    REVIEW OF SYSTEMS     REVIEW OF SYSTEMS  SLEEP ROS   Review of Systems   Respiratory:  Negative for snoring.    Neurological:  Positive for difficulty with concentration and excessive daytime sleepiness.       Psych Review of Symptoms:    Anxiety:   Generalized Anxiety Symptoms: Difficulty controlling worry, excessive worry, difficulty with concentration, fatigues easily, physiological symptoms of anxiety and sleep disturbances due to anxiety.       Depressive Symptoms:   Hypersomnia.       Sleep Concerns:   Excessive daytime sleepiness, difficulty staying asleep, restless sleep, awakening from sleep, difficulty falling asleep and disturbing dreams. No night terrors, no somnambulism, " no somniloquy and no snoring.             All other systems have been reviewed and are negative.      ALLERGIES     Allergies   Allergen Reactions    Penicillins Anaphylaxis, Hives and Swelling    Broccoli Unknown    Cabbage Unknown    Cauliflower Unknown    Diclofenac Unknown    Fluoxetine Unknown    Gabapentin Unknown    Meloxicam Unknown    Nalbuphine Unknown    Olive Extract Unknown    Onion Unknown    Oseltamivir Unknown    Peanut Unknown    Peas Unknown    Potato Unknown    Prochlorperazine Unknown    Sesame Seed Unknown    Soybean Unknown    Squash Unknown    Sulfa (Sulfonamide Antibiotics) Unknown    Sulfamethoxazole-Trimethoprim Unknown    Sunflower Seed Unknown    Ciprofloxacin Rash, Other and Unknown     Respiratory distress        MEDICATIONS     Current Outpatient Medications   Medication Sig Dispense Refill    albuterol (ProAir HFA) 90 mcg/actuation inhaler Inhale 2 puffs every 4 hours if needed.      ammonium lactate (Amlactin) 12 % cream APPLY  AND RUB  IN A THIN FILM TO AFFECTED AREAS TWICE DAILY.(AM AND PM).      CLINDAMYCIN HCL ORAL Take by mouth.      diphenoxylate-atropine (LomotiL) 2.5-0.025 mg tablet Take 1 tablet by mouth 4 times a day as needed.      ergocalciferol (Vitamin D-2) 1.25 MG (56843 UT) capsule Take 1 capsule (1,250 mcg) by mouth 1 (one) time per week.      esomeprazole (NexIUM) 40 mg DR capsule Take 1 capsule (40 mg) by mouth once daily.      fluticasone (Flonase) 50 mcg/actuation nasal spray Administer 2 sprays into each nostril once daily.      fluticasone furoate-vilanteroL (Breo Ellipta) 100-25 mcg/dose inhaler Inhale 1 puff once daily.      hydrocortisone acetate 0.5 % cream Externally      hydrOXYzine pamoate (Vistaril) 25 mg capsule Take 1 capsule (25 mg) by mouth every 8 hours if needed for itching or anxiety.      menthol (Biofreeze, menthol,) 4 % gel Apply topically to affected area 4 times a day, As Needed      mesalamine (Pentasa) 500 mg ER capsule Take 2 capsules  (1,000 mg) by mouth 4 times a day.      pancrelipase, Lip-Prot-Amyl, (Creon) 36,000-114,000- 180,000 unit capsule,delayed release(DR/EC) capsule as directed Orally      pregabalin (Lyrica) 50 mg capsule Take 1 capsule (50 mg) by mouth 2 times a day.      promethazine (Phenergan) 25 mg tablet Take 1 tablet (25 mg) by mouth every 12 hours if needed.      sucralfate (Carafate) 1 gram tablet Take 1 tablet (1 g) by mouth 2 times a day. On an empty stomach      topiramate (Topamax) 100 mg tablet Take 1 tablet (100 mg) by mouth 2 times a day.      topiramate (Topamax) 25 mg tablet Take 1 tablet (25 mg) by mouth twice a day.       No current facility-administered medications for this visit.       PAST HISTORY     PERTINENT PAST MEDICAL HISTORY: See HPI    PERTINENT PAST SURGICAL HISTORY for Sleep Medicine:  non-contributory    PERTINENT FAMILY HISTORY for Sleep Medicine:  Unknown. Patient is adopted.     PERTINENT SOCIAL HISTORY:  She  reports that she has never smoked. She has never used smokeless tobacco. No history on file for alcohol use and drug use. She currently lives alone and employed full-time    Active Problems, Allergy List, Medication List, and PMH/PSH/FH/Social Hx have been reviewed and reconciled in chart. No significant changes unless documented in the pertinent chart section. Updates made when necessary.     PHYSICAL EXAM     VITAL SIGNS: /84   Pulse 86   Wt 65.2 kg (143 lb 12.8 oz)   SpO2 97%   BMI 24.68 kg/m²     CURRENT WEIGHT:   Vitals:    06/24/24 1033   Weight: 65.2 kg (143 lb 12.8 oz)      BMI: Body mass index is 24.68 kg/m².     PREVIOUS WEIGHTS:  Wt Readings from Last 3 Encounters:   06/24/24 65.2 kg (143 lb 12.8 oz)   02/02/24 66.7 kg (147 lb)   01/17/24 67.5 kg (148 lb 12.8 oz)       Today ESS: 24  Today FAN: 19  Today TAMMI-7: 9   Today PHQ-2:     PHYSICAL EXAMINATION  General appearance: awake alert in NAD  Affect: normal  Skin: no rash noted to face  HEENT: Nasal congestion  "absent  Teeth: normal dentition  Lungs: no cough.  Extr: moves all four extremities  Neuro: normal speech        RESULTS/DATA     No results found for: \"IRON\", \"TRANSFERRIN\", \"IRONSAT\", \"TIBC\", \"FERRITIN\"    Bicarbonate   Date Value Ref Range Status   02/02/2024 26 21 - 32 mmol/L Final       PAP Adherence  Not applicable    ASSESSMENT/PLAN     Assessment/Plan   Benja Luis is a 53 y.o. female presents today in [facility] with the following problems:      SUSPECTED SLEEP APNEA ,   - Patient's risk factors for HILDA: age, postmenopause, asthma, and narrow crowded upper airway anatomy  - Current symptoms are: see HPI  - HILDA negative in past by PSG, no data available for review  - Due to negative testing in past and hx of narcolepsy, I recommend split-night PSG (Split if AHI>10) to evaluate for HILAD. + MSLT  - Discussed HILDA pathophysiology, diagnostic testing (HST vs PSG), cardiometabolic and neurocognitive sequelae of untreated HILDA, and treatment options (PAP therapy, oral appliance, surgery, hypoglossal nerve stimulator called INSPIRE, etc).    06/24/24  - patient has not completed tested as prescribed, has cancelled at sleep lab for various reason  - encouraged patient to complete testing as prescribed    EXCESSIVE DAYTIME SLEEPINESS (EDS) / FATIGUE   - due to combination of depression, anxiety, untreated sleep apnea, chronic pain, and narcoplepsy. Meds may be a contributing factor as well.  - discussed with patient good sleep hygiene  06/24/24  - no significant changes noted    NARCOLEPSY  - previously diagnosed via [facility], no data available to review  - Will order an overnight sleep study, followed by MSLT the next day  - Perform urine toxicology prior to sleep study.  - Will call patient within 3 weeks after the test. Will discuss follow up plan at that time.  - May consider checking TSH and iron studies to rule out underlying metabolic etiologies.  -Scheduled naps  -Consolidate night " time sleep.  - will continue with Adderall 20 mg for treatment   - previously tried Ritalin, has been on Adderall for over 4 years, stable till few months ago  - see med management below  06/24/24  - patient did not follow-up in clinic as instructed for medication management  - we had a lengthy discussion, she is has cancelled +4 appointment in the clinic for follow-up   - at this time, she is in violation of the CSA signed previously, no medications will be prescribed at this time.     NORMAL BMI  - BMI today Body mass index is 24.68 kg/m².   - no significant weight changes noted or reported  - Encouraged patient to maintain healthy weight with diet and exercise.   - Weight loss can help in the long term treatment of HILDA.  - defer management to PCP       BLOOD PRESSURE CHECK  - BP today 132/84  - vitals WNL     DEPRESSION / ANXIETY screen  DEPRESSION  / ANXIETY  / BIPOLAR   + screens  - denies any SI, HI or hallucinations   - currently on meds, well controlled  - established with [facility], see psych and therapy  - defer management to Psychiatry     SMOKING STATUS screen  - never a smoker     All of patient's questions were answered. She verbalizes understanding and agreement with my assessment and plan.

## 2024-06-24 NOTE — PATIENT INSTRUCTIONS
It was a pleasure meeting you today Benja Luis     As we discussed today in clinic:    1. Complete your sleep study as ordered, we will follow-up after sleep study  2. Encouraged to maintain healthy weight with diet and exercise  3. Remember, don't drive when sleepy.   4. At this time, you are in violation of your CSA. You have not followed up as prescribed. I will not continue with your prescription for stimulants until after you have been tested. Remember to take scheduled naps and maintain a consistent sleep schedule at night in the interim.  5. Follow-up with Dr. Adrián Reyes, my last day with [facility] will be August 16th, 2024. I am here to assist you for any issues in the interim.       Please follow-up in 4 - 6 weeks after testing    ALWAYS BRING YOUR CPAP / BIPAP WITH YOU TO EVERY APPOINTMENT!  THANKS    FOR QUESTIONS AND CONCERNS:   1. In case of problems with machine or mask interface, please contact your DME company first. DME is the company that provides you the machine and/or CPAP supplies. If [employer] if your DME, you can reach them at [phone] or [email or web address] ("[employer] [phone].  2. For SLEEP STUDY appointments, please call [phone] ([place]) or [phone] / [phone] ([facility]) or any other  Sleep Lab location please call [phone]  3. For MEDICAL QUESTIONS, MEDICATION REFILLS, or CLINIC APPOINTMENT SCHEDULING, please call [phone] and my practice lead Anne would gladly assist you with any concerns.     Here at [facility], we wish you a restful sleep!

## 2024-06-25 ASSESSMENT — ENCOUNTER SYMPTOMS
FATIGUES EASILY: 1
SNORING: 0
HYPERSOMNIA: 1
DISTURBING DREAMS: 1
DIFFICULTY WITH CONCENTRATION: 1
EXCESSIVE DAYTIME SLEEPINESS: 1

## 2024-06-26 PROBLEM — R29.818 SUSPECTED SLEEP APNEA: Status: ACTIVE | Noted: 2024-06-26

## 2024-08-06 ENCOUNTER — APPOINTMENT (OUTPATIENT)
Dept: SLEEP MEDICINE | Facility: CLINIC | Age: 54
End: 2024-08-06
Payer: MEDICARE

## 2024-09-23 ENCOUNTER — APPOINTMENT (OUTPATIENT)
Dept: SLEEP MEDICINE | Facility: CLINIC | Age: 54
End: 2024-09-23
Payer: MEDICARE

## 2024-11-05 ENCOUNTER — APPOINTMENT (OUTPATIENT)
Dept: SLEEP MEDICINE | Facility: CLINIC | Age: 54
End: 2024-11-05
Payer: MEDICARE

## 2024-11-06 ENCOUNTER — APPOINTMENT (OUTPATIENT)
Dept: SLEEP MEDICINE | Facility: CLINIC | Age: 54
End: 2024-11-06
Payer: MEDICARE

## 2024-11-29 ENCOUNTER — APPOINTMENT (OUTPATIENT)
Dept: VASCULAR SURGERY | Facility: HOSPITAL | Age: 54
End: 2024-11-29
Payer: MEDICARE

## 2025-01-24 ENCOUNTER — PRE-ADMISSION TESTING (OUTPATIENT)
Dept: PREADMISSION TESTING | Facility: HOSPITAL | Age: 55
End: 2025-01-24
Payer: MEDICARE

## 2025-01-24 VITALS
TEMPERATURE: 97.2 F | WEIGHT: 135 LBS | DIASTOLIC BLOOD PRESSURE: 83 MMHG | HEART RATE: 83 BPM | BODY MASS INDEX: 23.92 KG/M2 | RESPIRATION RATE: 16 BRPM | HEIGHT: 63 IN | OXYGEN SATURATION: 100 % | SYSTOLIC BLOOD PRESSURE: 111 MMHG

## 2025-01-24 PROCEDURE — 99204 OFFICE O/P NEW MOD 45 MIN: CPT | Performed by: NURSE PRACTITIONER

## 2025-01-24 RX ORDER — CHOLECALCIFEROL (VITAMIN D3) 50 MCG
50 TABLET ORAL DAILY
COMMUNITY

## 2025-01-24 RX ORDER — GENTAMICIN SULFATE 1 MG/G
CREAM TOPICAL 2 TIMES DAILY
COMMUNITY
Start: 2025-01-08

## 2025-01-24 RX ORDER — BUPROPION HYDROCHLORIDE 150 MG/1
150 TABLET ORAL EVERY MORNING
COMMUNITY
Start: 2025-01-02

## 2025-01-24 RX ORDER — PANTOPRAZOLE SODIUM 40 MG/1
40 TABLET, DELAYED RELEASE ORAL
COMMUNITY
Start: 2024-07-05

## 2025-01-24 RX ORDER — TRAMADOL HYDROCHLORIDE 50 MG/1
50 TABLET ORAL EVERY 8 HOURS PRN
COMMUNITY
Start: 2025-01-20

## 2025-01-24 RX ORDER — HYDROCORTISONE 25 MG/G
CREAM TOPICAL AS NEEDED
COMMUNITY
Start: 2025-01-09

## 2025-01-24 RX ORDER — MAGNESIUM HYDROXIDE 400 MG/5ML
SUSPENSION, ORAL (FINAL DOSE FORM) ORAL DAILY
COMMUNITY
Start: 2025-01-20

## 2025-01-24 RX ORDER — VONOPRAZAN FUMARATE 26.72 MG/1
1 TABLET ORAL DAILY
COMMUNITY
Start: 2025-01-10

## 2025-01-24 RX ORDER — METHOCARBAMOL 500 MG/1
500 TABLET, FILM COATED ORAL 3 TIMES DAILY PRN
COMMUNITY
Start: 2025-01-08

## 2025-01-24 RX ORDER — ESCITALOPRAM OXALATE 5 MG/1
5 TABLET ORAL
COMMUNITY
Start: 2024-09-30

## 2025-01-24 RX ORDER — LINACLOTIDE 145 UG/1
1 CAPSULE, GELATIN COATED ORAL AS NEEDED
COMMUNITY
Start: 2024-08-06

## 2025-01-24 ASSESSMENT — DUKE ACTIVITY SCORE INDEX (DASI)
CAN YOU DO LIGHT WORK AROUND THE HOUSE LIKE DUSTING OR WASHING DISHES: YES
CAN YOU DO MODERATE WORK AROUND THE HOUSE LIKE VACUUMING, SWEEPING FLOORS OR CARRYING GROCERIES: NO
TOTAL_SCORE: 12.7
CAN YOU HAVE SEXUAL RELATIONS: NO
CAN YOU CLIMB A FLIGHT OF STAIRS OR WALK UP A HILL: YES
CAN YOU WALK INDOORS, SUCH AS AROUND YOUR HOUSE: YES
CAN YOU TAKE CARE OF YOURSELF (EAT, DRESS, BATHE, OR USE TOILET): YES
CAN YOU RUN A SHORT DISTANCE: NO
CAN YOU DO YARD WORK LIKE RAKING LEAVES, WEEDING OR PUSHING A MOWER: NO
CAN YOU DO HEAVY WORK AROUND THE HOUSE LIKE SCRUBBING FLOORS OR LIFTING AND MOVING HEAVY FURNITURE: NO
DASI METS SCORE: 4.3
CAN YOU WALK A BLOCK OR TWO ON LEVEL GROUND: NO
CAN YOU PARTICIPATE IN MODERATE RECREATIONAL ACTIVITIES LIKE GOLF, BOWLING, DANCING, DOUBLES TENNIS OR THROWING A BASEBALL OR FOOTBALL: NO
CAN YOU PARTICIPATE IN STRENOUS SPORTS LIKE SWIMMING, SINGLES TENNIS, FOOTBALL, BASKETBALL, OR SKIING: NO

## 2025-01-24 ASSESSMENT — ENCOUNTER SYMPTOMS
BACK PAIN: 1
NERVOUS/ANXIOUS: 1
EYES NEGATIVE: 1
ENDOCRINE NEGATIVE: 1
RESPIRATORY NEGATIVE: 1
NEUROLOGICAL NEGATIVE: 1
NAUSEA: 1
CONSTITUTIONAL NEGATIVE: 1
CARDIOVASCULAR NEGATIVE: 1

## 2025-01-24 NOTE — PREPROCEDURE INSTRUCTIONS
Why must I stop eating and drinking near surgery time?  With sedation, food or liquid in your stomach can enter your lungs causing serious complications  Increases nausea and vomiting    When do I need to stop eating and drinking before my surgery?   Do not eat or drink after midnight the night before your surgery/procedure.  You may have small sips of water to take your medication.    PAT DISCHARGE INSTRUCTIONS    Please call the Same Day Surgery (SDS) Department of the hospital where your procedure will be performed after 2:00 PM the day before your surgery. If you are scheduled on a Monday, or a Tuesday following a Monday holiday, you will need to call on the last business day prior to your surgery.    [place] [id number] [place] [id number] [phone]  Second Floor      Please let your surgeon know if:      You develop any open sores, shingles, burning or painful urination as these may increase your risk of an infection.   You no longer wish to have the surgery.   Any other personal circumstances change that may lead to the need to cancel or defer this surgery-such as being sick or getting admitted to any hospital within one week of your planned procedure.    Your contact details change, such as a change of address or phone number.    Starting now:     Please DO NOT drink alcohol or smoke for 24 hours before surgery. It is well known that quitting smoking can make a huge difference to your health and recovery from surgery. The longer you abstain from smoking, the better your chances of a healthy recovery. If you need help with quitting, call 4-800-QUIT-NOW to be connected to a trained counselor who will discuss the best methods to help you quit.     Before your surgery:    Please stop all supplements 7 days prior to surgery. Or as directed by your surgeon.   Please stop taking NSAID pain medicine such as Advil and Motrin 7 days before surgery.    If you develop  any fever, cough, cold, rashes, cuts, scratches, scrapes, urinary symptoms or infection anywhere on your body (including teeth and gums) prior to surgery, please call your surgeon’s office as soon as possible. This may require treatment to reduce the chance of cancellation on the day of surgery.    The day before your surgery:   DIET- Please follow the diet instructions at the top of your packet.   Get a good night’s rest.  Use the special soap for bathing if you have been instructed to use one.    Scheduled surgery times may change and you will be notified if this occurs - please check your personal voicemail for any updates.     On the morning of surgery:   Wear comfortable, loose fitting clothes which open in the front. Please do not wear moisturizers, creams, lotions, makeup or perfume.    Please bring with you to surgery:   Photo ID and insurance card   Current list of medicines and allergies   Pacemaker/ Defibrillator/Heart stent cards   CPAP machine and mask    Slings/ splints/ crutches   A copy of your complete advanced directive/DHPOA.    Please do NOT bring with you to surgery:   All jewelry and valuables should be left at home.   Prosthetic devices such as contact lenses, hearing aids, dentures, eyelash extensions, hairpins and body piercings must be removed prior to going in to the surgical suite.    After outpatient surgery:   A responsible adult MUST accompany you at the time of discharge and stay with you for 24 hours after your surgery. You may NOT drive yourself home after surgery.    Do not drive, operate machinery, make critical decisions or do activities that require co-ordination or balance until after a night’s sleep.   Do not drink alcoholic beverages for 24 hours.   Instructions for resuming your medications will be provided by your surgeon.    CALL YOUR DOCTOR AFTER SURGERY IF YOU HAVE:     Chills and/or a fever of 101° F or higher.    Redness, swelling, pus or drainage from your surgical  wound or a bad smell from the wound.    Lightheadedness, fainting or confusion.    Persistent vomiting (throwing up) and are not able to eat or drink for 12 hours.    Three or more loose, watery bowel movements in 24 hours (diarrhea).   Difficulty or pain while urinating( after non-urological surgery)    Pain and swelling in your legs, especially if it is only on one side.    Difficulty breathing or are breathing faster than normal.    Any new concerning symptoms.         Medication List            Accurate as of January 24, 2025  4:09 PM. Always use your most recent med list.                ammonium lactate 12 % cream  Commonly known as: Amlactin  Medication Adjustments for Surgery: Do Not take on the morning of surgery     Antiseptic Skin Clnsr(chlorhe) 4 % external liquid  Generic drug: chlorhexidine  Medication Adjustments for Surgery: Take/Use as prescribed     Breo Ellipta 100-25 mcg/dose inhaler  Generic drug: fluticasone furoate-vilanteroL  Medication Adjustments for Surgery: Take on the morning of surgery     buPROPion  mg 24 hr tablet  Commonly known as: Wellbutrin XL  Medication Adjustments for Surgery: Take on the morning of surgery     CALCIUM CITRATE ORAL  Medication Adjustments for Surgery: Take on the morning of surgery     cholecalciferol 50 MCG (2000 UT) tablet  Commonly known as: Vitamin D-3  Medication Adjustments for Surgery: Do Not take on the morning of surgery     escitalopram 5 mg tablet  Commonly known as: Lexapro  Medication Adjustments for Surgery: Take on the morning of surgery     esomeprazole 40 mg DR capsule  Commonly known as: NexIUM  Medication Adjustments for Surgery: Take on the morning of surgery     fluticasone 50 mcg/actuation nasal spray  Commonly known as: Flonase  Medication Adjustments for Surgery: Take on the morning of surgery     gentamicin 0.1 % cream  Commonly known as: Garamycin  Medication Adjustments for Surgery: Take/Use as prescribed     hydrocortisone 2.5 %  rectal cream  Commonly known as: Anusol-HC  Medication Adjustments for Surgery: Take/Use as prescribed     hydrocortisone acetate 0.5 % cream  Medication Adjustments for Surgery: Take/Use as prescribed     hydrOXYzine pamoate 25 mg capsule  Commonly known as: Vistaril  Medication Adjustments for Surgery: Do Not take on the morning of surgery     Linzess 145 mcg capsule  Generic drug: linaCLOtide  Medication Adjustments for Surgery: Do Not take on the morning of surgery     LomotiL 2.5-0.025 mg tablet  Generic drug: diphenoxylate-atropine  Medication Adjustments for Surgery: Do Not take on the morning of surgery     methocarbamol 500 mg tablet  Commonly known as: Robaxin  Medication Adjustments for Surgery: Take/Use as prescribed     pantoprazole 40 mg EC tablet  Commonly known as: ProtoNix  Medication Adjustments for Surgery: Take on the morning of surgery     pregabalin 50 mg capsule  Commonly known as: Lyrica  Medication Adjustments for Surgery: Take on the morning of surgery     ProAir HFA 90 mcg/actuation inhaler  Generic drug: albuterol  Medication Adjustments for Surgery: Take/Use as prescribed     promethazine 25 mg tablet  Commonly known as: Phenergan  Medication Adjustments for Surgery: Take/Use as prescribed     topiramate 100 mg tablet  Commonly known as: Topamax  Medication Adjustments for Surgery: Take on the morning of surgery     traMADol 50 mg tablet  Commonly known as: Ultram  Medication Adjustments for Surgery: Take/Use as prescribed     Voquezna 20 mg tablet  Generic drug: vonoprazan  Medication Adjustments for Surgery: Take on the morning of surgery

## 2025-01-24 NOTE — CPM/PAT H&P
CPM/PAT Evaluation       Name: Benja Luis (Benja Luis)  /Age: 1970/54 y.o.     In-Person       Chief Complaint: chronic right 2nd toe ulcer    HPI    Pt is a 54 year old female with a chronic right 2nd toe ulcer. Pt reports she has had problems with right 2nd toe ulcers since she had surgery on her right foot.  Pt stated the ulcer was infected with Proteus mirabilis and she has been followed by Dr Hathaway for IV and oral antibiotics. She experiences right 2nd to pain and some mild pale colored drainage. Pt denies fevers or chills. Pt was examined by her surgeon and has been scheduled for right second toe amputation. Pt denies CP, SOB, or dizziness.     Past Medical History:   Diagnosis Date    Anxiety     Cervicalgia     Neck pain    Chronic foot ulcer (Multi)     right 2nd toe ulcer    Chronic sinusitis, unspecified     Sinusitis    COPD (chronic obstructive pulmonary disease) (Multi)     DVT (deep venous thrombosis) (Multi)     History of left lower leg DVT in     Erosive esophagitis     GERD (gastroesophageal reflux disease)     History of cocaine abuse (Multi)     last used in     Microcytic anemia     Migraines     Migraines     Narcolepsy     Other cervical disc degeneration, unspecified cervical region     Degeneration of cervical intervertebral disc    Other conditions influencing health status     Urinary Tract Infection    Personal history of (healed) traumatic fracture     History of fracture of rib    Personal history of other diseases of the musculoskeletal system and connective tissue     History of osteomyelitis    Personal history of other diseases of the musculoskeletal system and connective tissue     History of low back pain    Personal history of other diseases of the musculoskeletal system and connective tissue     History of radiculopathy    Personal history of other infectious and parasitic diseases     History of sepsis    Personal history of other mental and  behavioral disorders     History of depression    Personal history of other specified conditions     History of fatigue    Positive ARYAN (antinuclear antibody)     PTSD (post-traumatic stress disorder)     Seizures (Multi)     pseudoseizures       Past Surgical History:   Procedure Laterality Date    BLADDER SURGERY      bladder lift    CARPAL TUNNEL RELEASE Bilateral 09/19/2013    Neuroplasty Median Nerve At Carpal Tunnel    COLONOSCOPY  09/19/2013    Complete Colonoscopy    FOOT SURGERY Right     HYSTERECTOMY  09/19/2013    Hysterectomy    OTHER SURGICAL HISTORY  09/19/2013    Ant Spinal Diskect Osteophytect Lumb Interspace Microdiscect    OTHER SURGICAL HISTORY  12/09/2024    hiatal hernia surgey, Malik fundoplication    OTHER SURGICAL HISTORY Left     left leg stent    UPPER GASTROINTESTINAL ENDOSCOPY       Social History     Tobacco Use    Smoking status: Never     Passive exposure: Past    Smokeless tobacco: Never   Substance Use Topics    Alcohol use: Not Currently     Comment: ON OCCASION     Social History     Substance and Sexual Activity   Drug Use Not Currently    Types: Cocaine    Comment: QUIT 2012       Patient  has no history on file for sexual activity.    Family History   Problem Relation Name Age of Onset    Other (Back pain) Other         Allergies   Allergen Reactions    Meloxicam Itching and Swelling     THROAT    Oseltamivir Itching and Swelling     THROAT    Peanut Hives, Itching and Swelling     THROAT    Penicillins Anaphylaxis, Hives and Swelling    Sulfa (Sulfonamide Antibiotics) Itching and Swelling    Broccoli Unknown    Cabbage Unknown    Cauliflower Unknown    Diclofenac Itching    Fluoxetine Itching    Gabapentin Agitation    Nalbuphine Agitation    Olive Extract Unknown    Onion Unknown    Peas Unknown    Potato Unknown    Prochlorperazine Unknown    Sesame Seed Unknown    Soybean Unknown    Squash Unknown    Sulfamethoxazole-Trimethoprim Unknown    Sunflower Seed Unknown     Ciprofloxacin Rash, Other and Unknown     Respiratory distress      Current Outpatient Medications   Medication Sig Dispense Refill    Antiseptic Skin Clnsr,chlorhe, 4 % external liquid Apply topically once daily.      buPROPion XL (Wellbutrin XL) 150 mg 24 hr tablet Take 1 tablet (150 mg) by mouth once daily in the morning.      escitalopram (Lexapro) 5 mg tablet Take 1 tablet (5 mg) by mouth once daily.      gentamicin (Garamycin) 0.1 % cream Apply topically 2 times a day.      hydrocortisone (Anusol-HC) 2.5 % rectal cream Insert into the rectum if needed.      Linzess 145 mcg capsule Take 1 capsule (145 mcg) by mouth if needed.      methocarbamol (Robaxin) 500 mg tablet Take 1 tablet (500 mg) by mouth 3 times a day as needed.      pantoprazole (ProtoNix) 40 mg EC tablet Take 1 tablet (40 mg) by mouth once daily.      traMADol (Ultram) 50 mg tablet Take 1 tablet (50 mg) by mouth every 8 hours if needed.      Voquezna 20 mg tablet Take 1 tablet by mouth once daily.      albuterol (ProAir HFA) 90 mcg/actuation inhaler Inhale 2 puffs every 4 hours if needed.      ammonium lactate (Amlactin) 12 % cream APPLY  AND RUB  IN A THIN FILM TO AFFECTED AREAS TWICE DAILY.(AM AND PM).      CALCIUM CITRATE ORAL Take 1,000 mg by mouth once daily.      cholecalciferol (Vitamin D-3) 50 MCG (2000 UT) tablet Take 1 tablet (50 mcg) by mouth once daily.      diphenoxylate-atropine (LomotiL) 2.5-0.025 mg tablet Take 1 tablet by mouth 4 times a day as needed.      esomeprazole (NexIUM) 40 mg DR capsule Take 1 capsule (40 mg) by mouth once daily.      fluticasone (Flonase) 50 mcg/actuation nasal spray Administer 2 sprays into each nostril once daily.      fluticasone furoate-vilanteroL (Breo Ellipta) 100-25 mcg/dose inhaler Inhale 1 puff once daily.      hydrocortisone acetate 0.5 % cream Apply topically 1 time if needed.      hydrOXYzine pamoate (Vistaril) 25 mg capsule Take 1 capsule (25 mg) by mouth every 8 hours if needed for itching  or anxiety.      pregabalin (Lyrica) 50 mg capsule Take 1 capsule (50 mg) by mouth 2 times a day.      promethazine (Phenergan) 25 mg tablet Take 1 tablet (25 mg) by mouth every 12 hours if needed.      topiramate (Topamax) 100 mg tablet Take 1 tablet (100 mg) by mouth 2 times a day.       No current facility-administered medications for this visit.     Review of Systems   Constitutional: Negative.    HENT: Negative.     Eyes: Negative.    Respiratory: Negative.     Cardiovascular: Negative.    Gastrointestinal:  Positive for nausea (chronic nausea).        Recent hiatal hernia surgery on 12/9/2024   Endocrine: Negative.    Genitourinary: Negative.    Musculoskeletal:  Positive for back pain.   Skin: Negative.         Right second toe ulcer   Neurological: Negative.    Psychiatric/Behavioral:  The patient is nervous/anxious.      Physical Exam  Vitals reviewed.   Constitutional:       Appearance: Normal appearance. She is normal weight.   HENT:      Head: Normocephalic and atraumatic.      Nose: Nose normal.      Mouth/Throat:      Mouth: Mucous membranes are moist.      Pharynx: Oropharynx is clear.   Eyes:      Extraocular Movements: Extraocular movements intact.      Conjunctiva/sclera: Conjunctivae normal.      Pupils: Pupils are equal, round, and reactive to light.   Cardiovascular:      Rate and Rhythm: Normal rate and regular rhythm.      Pulses: Normal pulses.      Heart sounds: Normal heart sounds.   Pulmonary:      Effort: Pulmonary effort is normal.      Breath sounds: Normal breath sounds.   Abdominal:      General: Bowel sounds are normal.      Palpations: Abdomen is soft.   Musculoskeletal:         General: Normal range of motion.      Cervical back: Normal range of motion and neck supple.   Skin:     Comments: Right 2nd toe ulcer. Mild erythema noted to right second toe. No right 2nd toe ulcer drainage.    Neurological:      General: No focal deficit present.      Mental Status: She is alert and  "oriented to person, place, and time. Mental status is at baseline.   Psychiatric:         Mood and Affect: Mood normal.         Behavior: Behavior normal.         Thought Content: Thought content normal.         Judgment: Judgment normal.          PAT AIRWAY:   Airway:     Mallampati::  II    TM distance::  >3 FB    Neck ROM::  Full      Testing/Diagnostic:     Patient Specialist/PCP:     Visit Vitals  /83   Pulse 83   Temp 36.2 °C (97.2 °F) (Temporal)   Resp 16   Ht 1.6 m (5' 3\")   Wt 61.2 kg (135 lb)   SpO2 100%   BMI 23.91 kg/m²   Smoking Status Never   BSA 1.65 m²     ASA: 3  CHADS: 1.9%  RCRI: 0.4%  Ariscat: 1.6%  DASI Risk Score      Flowsheet Row Pre-Admission Testing from 1/24/2025 in [facility] Questionnaire Series Submission from 1/19/2025 in [facility] with Generic Provider Fish   Can you take care of yourself (eat, dress, bathe, or use toilet)?  2.75 filed at 01/24/2025 1552 2.75  filed at 01/19/2025 0601   Can you walk indoors, such as around your house? 1.75 filed at 01/24/2025 1552 1.75  filed at 01/19/2025 0601   Can you walk a block or two on level ground?  0 filed at 01/24/2025 1552 0  filed at 01/19/2025 0601   Can you climb a flight of stairs or walk up a hill? 5.5 filed at 01/24/2025 1552 5.5  filed at 01/19/2025 0601   Can you run a short distance? 0 filed at 01/24/2025 1552 0  filed at 01/19/2025 0601   Can you do light work around the house like dusting or washing dishes? 2.7 filed at 01/24/2025 1552 2.7  filed at 01/19/2025 0601   Can you do moderate work around the house like vacuuming, sweeping floors or carrying groceries? 0 filed at 01/24/2025 1552 0  filed at 01/19/2025 0601   Can you do heavy work around the house like scrubbing floors or lifting and moving heavy furniture?  0 filed at 01/24/2025 1552 0  filed at 01/19/2025 0601   Can you do yard work like raking leaves, weeding or pushing a mower? 0 filed at 01/24/2025 1552 0  filed at 01/19/2025 0601   Can " you have sexual relations? 0 filed at 01/24/2025 1552 --   Can you participate in moderate recreational activities like golf, bowling, dancing, doubles tennis or throwing a baseball or football? 0 filed at 01/24/2025 1552 0  filed at 01/19/2025 0601   Can you participate in strenous sports like swimming, singles tennis, football, basketball, or skiing? 0 filed at 01/24/2025 1552 0  filed at 01/19/2025 0601   DASI SCORE 12.7 filed at 01/24/2025 1552 --   METS Score (Will be calculated only when all the questions are answered) 4.3 filed at 01/24/2025 1552 --          Caprini DVT Assessment    No data to display       Modified Frailty Index    No data to display       CHADS2 Stroke Risk  Current as of 6 minutes ago        N/A 3 to 100%: High Risk   2 to < 3%: Medium Risk   0 to < 2%: Low Risk     Last Change: N/A          This score determines the patient's risk of having a stroke if the patient has atrial fibrillation.        This score is not applicable to this patient. Components are not calculated.          Revised Cardiac Risk Index      Flowsheet Row Pre-Admission Testing from 1/24/2025 in [facility]   High-Risk Surgery (Intraperitoneal, Intrathoracic,Suprainguinal vascular) 0 filed at 01/24/2025 1649   History of ischemic heart disease (History of MI, History of positive exercuse test, Current chest paint considered due to myocardial ischemia, Use of nitrate therapy, ECG with pathological Q Waves) 0 filed at 01/24/2025 1649   History of congestive heart failure (pulmonary edemia, bilateral rales or S3 gallop, Paroxysmal nocturnal dyspnea, CXR showing pulmonary vascular redistribution) 0 filed at 01/24/2025 1649   History of cerebrovascular disease (Prior TIA or stroke) 0 filed at 01/24/2025 1649   Pre-operative insulin treatment 0 filed at 01/24/2025 1649   Pre-operative creatinine>2 mg/dl 0 filed at 01/24/2025 1649   Revised Cardiac Risk Calculator 0 filed at 01/24/2025 1649          Apfel  Simplified Score    No data to display       Risk Analysis Index Results This Encounter    No data found in the last 10 encounters.       Stop Bang Score      Flowsheet Row Pre-Admission Testing from 1/24/2025 in [facility] Questionnaire Series Submission from 1/19/2025 in [facility] with Generic Provider Fish   Do you snore loudly? 0 filed at 01/24/2025 1552 0  filed at 01/19/2025 0601   Do you often feel tired or fatigued after your sleep? 1 filed at 01/24/2025 1552 1  filed at 01/19/2025 0601   Has anyone ever observed you stop breathing in your sleep? 0 filed at 01/24/2025 1552 0  filed at 01/19/2025 0601   Do you have or are you being treated for high blood pressure? 0 filed at 01/24/2025 1552 0  filed at 01/19/2025 0601   Recent BMI (Calculated) 25.2 filed at 01/24/2025 1552 25.2  filed at 01/19/2025 0601   Is BMI greater than 35 kg/m2? 0=No filed at 01/24/2025 1552 0=No  filed at 01/19/2025 0601   Age older than 50 years old? 1=Yes filed at 01/24/2025 1552 1=Yes  filed at 01/19/2025 0601   Is your neck circumference greater than 17 inches (Male) or 16 inches (Female)? 0 filed at 01/24/2025 1552 --   Gender - Male 0=No filed at 01/24/2025 1552 0=No  filed at 01/19/2025 0601   STOP-BANG Total Score 2 filed at 01/24/2025 1552 --          Prodigy: High Risk  Total Score: 3              Prodigy Previous Opioid Use Score           ARISCAT Score for Postoperative Pulmonary Complications    No data to display       Solares Perioperative Risk for Myocardial Infarction or Cardiac Arrest (HILLARY)    No data to display         Assessment and Plan:     Chronic osteomyelitis of toe, right, Acquired hammer toe of right foot: Second Toe Amputation right.  COPD: Pt is on Breo Ellipta daily. Rare albuterol inhaler use. Pt denies SOB.  Narcolepsy:   Migraines: Pt is taking topiramate.   Pseudoseizures:  H/O DVT: left leg DVT in 2011. Pt has stent placed in left leg.   Anxiety/PTSD: Take escitalopram and  bupropion XL.   Narcolepsy/ sleep walking:  H/O cocaine abuse: last used 2012  Chronic back pain: plans for future surgery. Pt is taking pregabalin.   Eosinophilic esophagitis:  Erosive esophagitis/ GERD: improved after Hiatal hernia surgery and Malik fundoplication on 12/9/2024. Pt is taking Voquezna, pantoprazole, and esomerazole.   Chronic nausea: Pt takes as needed promethazine.   H/O Crohn's: in remission.     CBC completed on 1/23/2025.  CMP completed on 1/13/2025.  EKG completed on 7/29/2024.    Renu Becker, APRN-CNP

## 2025-01-24 NOTE — H&P (VIEW-ONLY)
CPM/PAT Evaluation       Name: Benja Luis (Benja Luis)  /Age: 1970/54 y.o.     In-Person       Chief Complaint: chronic right 2nd toe ulcer    HPI    Pt is a 54 year old female with a chronic right 2nd toe ulcer. Pt reports she has had problems with right 2nd toe ulcers since she had surgery on her right foot.  Pt stated the ulcer was infected with Proteus mirabilis and she has been followed by Dr Hathaway for IV and oral antibiotics. She experiences right 2nd to pain and some mild pale colored drainage. Pt denies fevers or chills. Pt was examined by her surgeon and has been scheduled for right second toe amputation. Pt denies CP, SOB, or dizziness.     Past Medical History:   Diagnosis Date    Anxiety     Cervicalgia     Neck pain    Chronic foot ulcer (Multi)     right 2nd toe ulcer    Chronic sinusitis, unspecified     Sinusitis    COPD (chronic obstructive pulmonary disease) (Multi)     DVT (deep venous thrombosis) (Multi)     History of left lower leg DVT in     Erosive esophagitis     GERD (gastroesophageal reflux disease)     History of cocaine abuse (Multi)     last used in     Microcytic anemia     Migraines     Migraines     Narcolepsy     Other cervical disc degeneration, unspecified cervical region     Degeneration of cervical intervertebral disc    Other conditions influencing health status     Urinary Tract Infection    Personal history of (healed) traumatic fracture     History of fracture of rib    Personal history of other diseases of the musculoskeletal system and connective tissue     History of osteomyelitis    Personal history of other diseases of the musculoskeletal system and connective tissue     History of low back pain    Personal history of other diseases of the musculoskeletal system and connective tissue     History of radiculopathy    Personal history of other infectious and parasitic diseases     History of sepsis    Personal history of other mental and  behavioral disorders     History of depression    Personal history of other specified conditions     History of fatigue    Positive ARYAN (antinuclear antibody)     PTSD (post-traumatic stress disorder)     Seizures (Multi)     pseudoseizures       Past Surgical History:   Procedure Laterality Date    BLADDER SURGERY      bladder lift    CARPAL TUNNEL RELEASE Bilateral 09/19/2013    Neuroplasty Median Nerve At Carpal Tunnel    COLONOSCOPY  09/19/2013    Complete Colonoscopy    FOOT SURGERY Right     HYSTERECTOMY  09/19/2013    Hysterectomy    OTHER SURGICAL HISTORY  09/19/2013    Ant Spinal Diskect Osteophytect Lumb Interspace Microdiscect    OTHER SURGICAL HISTORY  12/09/2024    hiatal hernia surgey, Malik fundoplication    OTHER SURGICAL HISTORY Left     left leg stent    UPPER GASTROINTESTINAL ENDOSCOPY       Social History     Tobacco Use    Smoking status: Never     Passive exposure: Past    Smokeless tobacco: Never   Substance Use Topics    Alcohol use: Not Currently     Comment: ON OCCASION     Social History     Substance and Sexual Activity   Drug Use Not Currently    Types: Cocaine    Comment: QUIT 2012       Patient  has no history on file for sexual activity.    Family History   Problem Relation Name Age of Onset    Other (Back pain) Other         Allergies   Allergen Reactions    Meloxicam Itching and Swelling     THROAT    Oseltamivir Itching and Swelling     THROAT    Peanut Hives, Itching and Swelling     THROAT    Penicillins Anaphylaxis, Hives and Swelling    Sulfa (Sulfonamide Antibiotics) Itching and Swelling    Broccoli Unknown    Cabbage Unknown    Cauliflower Unknown    Diclofenac Itching    Fluoxetine Itching    Gabapentin Agitation    Nalbuphine Agitation    Olive Extract Unknown    Onion Unknown    Peas Unknown    Potato Unknown    Prochlorperazine Unknown    Sesame Seed Unknown    Soybean Unknown    Squash Unknown    Sulfamethoxazole-Trimethoprim Unknown    Sunflower Seed Unknown     Ciprofloxacin Rash, Other and Unknown     Respiratory distress      Current Outpatient Medications   Medication Sig Dispense Refill    Antiseptic Skin Clnsr,chlorhe, 4 % external liquid Apply topically once daily.      buPROPion XL (Wellbutrin XL) 150 mg 24 hr tablet Take 1 tablet (150 mg) by mouth once daily in the morning.      escitalopram (Lexapro) 5 mg tablet Take 1 tablet (5 mg) by mouth once daily.      gentamicin (Garamycin) 0.1 % cream Apply topically 2 times a day.      hydrocortisone (Anusol-HC) 2.5 % rectal cream Insert into the rectum if needed.      Linzess 145 mcg capsule Take 1 capsule (145 mcg) by mouth if needed.      methocarbamol (Robaxin) 500 mg tablet Take 1 tablet (500 mg) by mouth 3 times a day as needed.      pantoprazole (ProtoNix) 40 mg EC tablet Take 1 tablet (40 mg) by mouth once daily.      traMADol (Ultram) 50 mg tablet Take 1 tablet (50 mg) by mouth every 8 hours if needed.      Voquezna 20 mg tablet Take 1 tablet by mouth once daily.      albuterol (ProAir HFA) 90 mcg/actuation inhaler Inhale 2 puffs every 4 hours if needed.      ammonium lactate (Amlactin) 12 % cream APPLY  AND RUB  IN A THIN FILM TO AFFECTED AREAS TWICE DAILY.(AM AND PM).      CALCIUM CITRATE ORAL Take 1,000 mg by mouth once daily.      cholecalciferol (Vitamin D-3) 50 MCG (2000 UT) tablet Take 1 tablet (50 mcg) by mouth once daily.      diphenoxylate-atropine (LomotiL) 2.5-0.025 mg tablet Take 1 tablet by mouth 4 times a day as needed.      esomeprazole (NexIUM) 40 mg DR capsule Take 1 capsule (40 mg) by mouth once daily.      fluticasone (Flonase) 50 mcg/actuation nasal spray Administer 2 sprays into each nostril once daily.      fluticasone furoate-vilanteroL (Breo Ellipta) 100-25 mcg/dose inhaler Inhale 1 puff once daily.      hydrocortisone acetate 0.5 % cream Apply topically 1 time if needed.      hydrOXYzine pamoate (Vistaril) 25 mg capsule Take 1 capsule (25 mg) by mouth every 8 hours if needed for itching  or anxiety.      pregabalin (Lyrica) 50 mg capsule Take 1 capsule (50 mg) by mouth 2 times a day.      promethazine (Phenergan) 25 mg tablet Take 1 tablet (25 mg) by mouth every 12 hours if needed.      topiramate (Topamax) 100 mg tablet Take 1 tablet (100 mg) by mouth 2 times a day.       No current facility-administered medications for this visit.     Review of Systems   Constitutional: Negative.    HENT: Negative.     Eyes: Negative.    Respiratory: Negative.     Cardiovascular: Negative.    Gastrointestinal:  Positive for nausea (chronic nausea).        Recent hiatal hernia surgery on 12/9/2024   Endocrine: Negative.    Genitourinary: Negative.    Musculoskeletal:  Positive for back pain.   Skin: Negative.         Right second toe ulcer   Neurological: Negative.    Psychiatric/Behavioral:  The patient is nervous/anxious.      Physical Exam  Vitals reviewed.   Constitutional:       Appearance: Normal appearance. She is normal weight.   HENT:      Head: Normocephalic and atraumatic.      Nose: Nose normal.      Mouth/Throat:      Mouth: Mucous membranes are moist.      Pharynx: Oropharynx is clear.   Eyes:      Extraocular Movements: Extraocular movements intact.      Conjunctiva/sclera: Conjunctivae normal.      Pupils: Pupils are equal, round, and reactive to light.   Cardiovascular:      Rate and Rhythm: Normal rate and regular rhythm.      Pulses: Normal pulses.      Heart sounds: Normal heart sounds.   Pulmonary:      Effort: Pulmonary effort is normal.      Breath sounds: Normal breath sounds.   Abdominal:      General: Bowel sounds are normal.      Palpations: Abdomen is soft.   Musculoskeletal:         General: Normal range of motion.      Cervical back: Normal range of motion and neck supple.   Skin:     Comments: Right 2nd toe ulcer. Mild erythema noted to right second toe. No right 2nd toe ulcer drainage.    Neurological:      General: No focal deficit present.      Mental Status: She is alert and  "oriented to person, place, and time. Mental status is at baseline.   Psychiatric:         Mood and Affect: Mood normal.         Behavior: Behavior normal.         Thought Content: Thought content normal.         Judgment: Judgment normal.          PAT AIRWAY:   Airway:     Mallampati::  II    TM distance::  >3 FB    Neck ROM::  Full      Testing/Diagnostic:     Patient Specialist/PCP:     Visit Vitals  /83   Pulse 83   Temp 36.2 °C (97.2 °F) (Temporal)   Resp 16   Ht 1.6 m (5' 3\")   Wt 61.2 kg (135 lb)   SpO2 100%   BMI 23.91 kg/m²   Smoking Status Never   BSA 1.65 m²     ASA: 3  CHADS: 1.9%  RCRI: 0.4%  Ariscat: 1.6%  DASI Risk Score      Flowsheet Row Pre-Admission Testing from 1/24/2025 in [facility] Questionnaire Series Submission from 1/19/2025 in [facility] with Generic Provider Fish   Can you take care of yourself (eat, dress, bathe, or use toilet)?  2.75 filed at 01/24/2025 1552 2.75  filed at 01/19/2025 0601   Can you walk indoors, such as around your house? 1.75 filed at 01/24/2025 1552 1.75  filed at 01/19/2025 0601   Can you walk a block or two on level ground?  0 filed at 01/24/2025 1552 0  filed at 01/19/2025 0601   Can you climb a flight of stairs or walk up a hill? 5.5 filed at 01/24/2025 1552 5.5  filed at 01/19/2025 0601   Can you run a short distance? 0 filed at 01/24/2025 1552 0  filed at 01/19/2025 0601   Can you do light work around the house like dusting or washing dishes? 2.7 filed at 01/24/2025 1552 2.7  filed at 01/19/2025 0601   Can you do moderate work around the house like vacuuming, sweeping floors or carrying groceries? 0 filed at 01/24/2025 1552 0  filed at 01/19/2025 0601   Can you do heavy work around the house like scrubbing floors or lifting and moving heavy furniture?  0 filed at 01/24/2025 1552 0  filed at 01/19/2025 0601   Can you do yard work like raking leaves, weeding or pushing a mower? 0 filed at 01/24/2025 1552 0  filed at 01/19/2025 0601   Can " you have sexual relations? 0 filed at 01/24/2025 1552 --   Can you participate in moderate recreational activities like golf, bowling, dancing, doubles tennis or throwing a baseball or football? 0 filed at 01/24/2025 1552 0  filed at 01/19/2025 0601   Can you participate in strenous sports like swimming, singles tennis, football, basketball, or skiing? 0 filed at 01/24/2025 1552 0  filed at 01/19/2025 0601   DASI SCORE 12.7 filed at 01/24/2025 1552 --   METS Score (Will be calculated only when all the questions are answered) 4.3 filed at 01/24/2025 1552 --          Caprini DVT Assessment    No data to display       Modified Frailty Index    No data to display       CHADS2 Stroke Risk  Current as of 6 minutes ago        N/A 3 to 100%: High Risk   2 to < 3%: Medium Risk   0 to < 2%: Low Risk     Last Change: N/A          This score determines the patient's risk of having a stroke if the patient has atrial fibrillation.        This score is not applicable to this patient. Components are not calculated.          Revised Cardiac Risk Index      Flowsheet Row Pre-Admission Testing from 1/24/2025 in [facility]   High-Risk Surgery (Intraperitoneal, Intrathoracic,Suprainguinal vascular) 0 filed at 01/24/2025 1649   History of ischemic heart disease (History of MI, History of positive exercuse test, Current chest paint considered due to myocardial ischemia, Use of nitrate therapy, ECG with pathological Q Waves) 0 filed at 01/24/2025 1649   History of congestive heart failure (pulmonary edemia, bilateral rales or S3 gallop, Paroxysmal nocturnal dyspnea, CXR showing pulmonary vascular redistribution) 0 filed at 01/24/2025 1649   History of cerebrovascular disease (Prior TIA or stroke) 0 filed at 01/24/2025 1649   Pre-operative insulin treatment 0 filed at 01/24/2025 1649   Pre-operative creatinine>2 mg/dl 0 filed at 01/24/2025 1649   Revised Cardiac Risk Calculator 0 filed at 01/24/2025 1649          Apfel  Simplified Score    No data to display       Risk Analysis Index Results This Encounter    No data found in the last 10 encounters.       Stop Bang Score      Flowsheet Row Pre-Admission Testing from 1/24/2025 in [facility] Questionnaire Series Submission from 1/19/2025 in [facility] with Generic Provider Fish   Do you snore loudly? 0 filed at 01/24/2025 1552 0  filed at 01/19/2025 0601   Do you often feel tired or fatigued after your sleep? 1 filed at 01/24/2025 1552 1  filed at 01/19/2025 0601   Has anyone ever observed you stop breathing in your sleep? 0 filed at 01/24/2025 1552 0  filed at 01/19/2025 0601   Do you have or are you being treated for high blood pressure? 0 filed at 01/24/2025 1552 0  filed at 01/19/2025 0601   Recent BMI (Calculated) 25.2 filed at 01/24/2025 1552 25.2  filed at 01/19/2025 0601   Is BMI greater than 35 kg/m2? 0=No filed at 01/24/2025 1552 0=No  filed at 01/19/2025 0601   Age older than 50 years old? 1=Yes filed at 01/24/2025 1552 1=Yes  filed at 01/19/2025 0601   Is your neck circumference greater than 17 inches (Male) or 16 inches (Female)? 0 filed at 01/24/2025 1552 --   Gender - Male 0=No filed at 01/24/2025 1552 0=No  filed at 01/19/2025 0601   STOP-BANG Total Score 2 filed at 01/24/2025 1552 --          Prodigy: High Risk  Total Score: 3              Prodigy Previous Opioid Use Score           ARISCAT Score for Postoperative Pulmonary Complications    No data to display       Solares Perioperative Risk for Myocardial Infarction or Cardiac Arrest (HILLARY)    No data to display         Assessment and Plan:     Chronic osteomyelitis of toe, right, Acquired hammer toe of right foot: Second Toe Amputation right.  COPD: Pt is on Breo Ellipta daily. Rare albuterol inhaler use. Pt denies SOB.  Narcolepsy:   Migraines: Pt is taking topiramate.   Pseudoseizures:  H/O DVT: left leg DVT in 2011. Pt has stent placed in left leg.   Anxiety/PTSD: Take escitalopram and  bupropion XL.   Narcolepsy/ sleep walking:  H/O cocaine abuse: last used 2012  Chronic back pain: plans for future surgery. Pt is taking pregabalin.   Eosinophilic esophagitis:  Erosive esophagitis/ GERD: improved after Hiatal hernia surgery and Malik fundoplication on 12/9/2024. Pt is taking Voquezna, pantoprazole, and esomerazole.   Chronic nausea: Pt takes as needed promethazine.   H/O Crohn's: in remission.     CBC completed on 1/23/2025.  CMP completed on 1/13/2025.  EKG completed on 7/29/2024.    Renu Becker, APRN-CNP

## 2025-01-29 ENCOUNTER — PHARMACY VISIT (OUTPATIENT)
Dept: PHARMACY | Facility: CLINIC | Age: 55
End: 2025-01-29
Payer: MEDICARE

## 2025-01-29 ENCOUNTER — HOSPITAL ENCOUNTER (OUTPATIENT)
Facility: HOSPITAL | Age: 55
Setting detail: OUTPATIENT SURGERY
Discharge: HOME | End: 2025-01-29
Attending: STUDENT IN AN ORGANIZED HEALTH CARE EDUCATION/TRAINING PROGRAM | Admitting: STUDENT IN AN ORGANIZED HEALTH CARE EDUCATION/TRAINING PROGRAM
Payer: MEDICARE

## 2025-01-29 ENCOUNTER — ANESTHESIA EVENT (OUTPATIENT)
Dept: OPERATING ROOM | Facility: HOSPITAL | Age: 55
End: 2025-01-29
Payer: MEDICARE

## 2025-01-29 ENCOUNTER — ANESTHESIA (OUTPATIENT)
Dept: OPERATING ROOM | Facility: HOSPITAL | Age: 55
End: 2025-01-29
Payer: MEDICARE

## 2025-01-29 VITALS
TEMPERATURE: 98.2 F | HEIGHT: 63 IN | HEART RATE: 83 BPM | SYSTOLIC BLOOD PRESSURE: 131 MMHG | DIASTOLIC BLOOD PRESSURE: 84 MMHG | BODY MASS INDEX: 23.92 KG/M2 | RESPIRATION RATE: 15 BRPM | WEIGHT: 135 LBS | OXYGEN SATURATION: 97 %

## 2025-01-29 DIAGNOSIS — M86.671: ICD-10-CM

## 2025-01-29 DIAGNOSIS — Z98.890 POST-OPERATIVE STATE: ICD-10-CM

## 2025-01-29 DIAGNOSIS — G89.18 POST-OP PAIN: Primary | ICD-10-CM

## 2025-01-29 DIAGNOSIS — M20.41 ACQUIRED HAMMER TOE OF RIGHT FOOT: ICD-10-CM

## 2025-01-29 LAB — PREGNANCY TEST URINE, POC: NORMAL

## 2025-01-29 PROCEDURE — 81025 URINE PREGNANCY TEST: CPT | Performed by: STUDENT IN AN ORGANIZED HEALTH CARE EDUCATION/TRAINING PROGRAM

## 2025-01-29 PROCEDURE — 2500000004 HC RX 250 GENERAL PHARMACY W/ HCPCS (ALT 636 FOR OP/ED): Performed by: STUDENT IN AN ORGANIZED HEALTH CARE EDUCATION/TRAINING PROGRAM

## 2025-01-29 PROCEDURE — 3600000008 HC OR TIME - EACH INCREMENTAL 1 MINUTE - PROCEDURE LEVEL THREE: Performed by: STUDENT IN AN ORGANIZED HEALTH CARE EDUCATION/TRAINING PROGRAM

## 2025-01-29 PROCEDURE — 7100000001 HC RECOVERY ROOM TIME - INITIAL BASE CHARGE: Performed by: STUDENT IN AN ORGANIZED HEALTH CARE EDUCATION/TRAINING PROGRAM

## 2025-01-29 PROCEDURE — 3600000003 HC OR TIME - INITIAL BASE CHARGE - PROCEDURE LEVEL THREE: Performed by: STUDENT IN AN ORGANIZED HEALTH CARE EDUCATION/TRAINING PROGRAM

## 2025-01-29 PROCEDURE — 88305 TISSUE EXAM BY PATHOLOGIST: CPT | Performed by: PATHOLOGY

## 2025-01-29 PROCEDURE — 7100000002 HC RECOVERY ROOM TIME - EACH INCREMENTAL 1 MINUTE: Performed by: STUDENT IN AN ORGANIZED HEALTH CARE EDUCATION/TRAINING PROGRAM

## 2025-01-29 PROCEDURE — 7100000009 HC PHASE TWO TIME - INITIAL BASE CHARGE: Performed by: STUDENT IN AN ORGANIZED HEALTH CARE EDUCATION/TRAINING PROGRAM

## 2025-01-29 PROCEDURE — A28820 PR AMPUTATION TOE,MT-P JT: Performed by: ANESTHESIOLOGY

## 2025-01-29 PROCEDURE — 2500000004 HC RX 250 GENERAL PHARMACY W/ HCPCS (ALT 636 FOR OP/ED): Performed by: NURSE ANESTHETIST, CERTIFIED REGISTERED

## 2025-01-29 PROCEDURE — 88305 TISSUE EXAM BY PATHOLOGIST: CPT | Mod: TC,TRILAB,WESLAB | Performed by: STUDENT IN AN ORGANIZED HEALTH CARE EDUCATION/TRAINING PROGRAM

## 2025-01-29 PROCEDURE — 3700000002 HC GENERAL ANESTHESIA TIME - EACH INCREMENTAL 1 MINUTE: Performed by: STUDENT IN AN ORGANIZED HEALTH CARE EDUCATION/TRAINING PROGRAM

## 2025-01-29 PROCEDURE — 2720000007 HC OR 272 NO HCPCS: Performed by: STUDENT IN AN ORGANIZED HEALTH CARE EDUCATION/TRAINING PROGRAM

## 2025-01-29 PROCEDURE — 7100000010 HC PHASE TWO TIME - EACH INCREMENTAL 1 MINUTE: Performed by: STUDENT IN AN ORGANIZED HEALTH CARE EDUCATION/TRAINING PROGRAM

## 2025-01-29 PROCEDURE — 3700000001 HC GENERAL ANESTHESIA TIME - INITIAL BASE CHARGE: Performed by: STUDENT IN AN ORGANIZED HEALTH CARE EDUCATION/TRAINING PROGRAM

## 2025-01-29 PROCEDURE — 88311 DECALCIFY TISSUE: CPT | Performed by: PATHOLOGY

## 2025-01-29 PROCEDURE — A28820 PR AMPUTATION TOE,MT-P JT: Performed by: NURSE ANESTHETIST, CERTIFIED REGISTERED

## 2025-01-29 PROCEDURE — 88304 TISSUE EXAM BY PATHOLOGIST: CPT | Performed by: PATHOLOGY

## 2025-01-29 PROCEDURE — RXMED WILLOW AMBULATORY MEDICATION CHARGE

## 2025-01-29 PROCEDURE — 2500000004 HC RX 250 GENERAL PHARMACY W/ HCPCS (ALT 636 FOR OP/ED): Performed by: ANESTHESIOLOGY

## 2025-01-29 RX ORDER — DOXYCYCLINE 100 MG/1
100 CAPSULE ORAL 2 TIMES DAILY
Qty: 14 CAPSULE | Refills: 0 | Status: SHIPPED | OUTPATIENT
Start: 2025-01-29 | End: 2025-02-05

## 2025-01-29 RX ORDER — MIDAZOLAM HYDROCHLORIDE 1 MG/ML
1 INJECTION, SOLUTION INTRAMUSCULAR; INTRAVENOUS ONCE AS NEEDED
Status: DISCONTINUED | OUTPATIENT
Start: 2025-01-29 | End: 2025-01-29 | Stop reason: HOSPADM

## 2025-01-29 RX ORDER — VANCOMYCIN 1 G/200ML
1000 INJECTION, SOLUTION INTRAVENOUS ONCE
Status: COMPLETED | OUTPATIENT
Start: 2025-01-29 | End: 2025-01-29

## 2025-01-29 RX ORDER — MIDAZOLAM HYDROCHLORIDE 1 MG/ML
INJECTION, SOLUTION INTRAMUSCULAR; INTRAVENOUS AS NEEDED
Status: DISCONTINUED | OUTPATIENT
Start: 2025-01-29 | End: 2025-01-29

## 2025-01-29 RX ORDER — LIDOCAINE HYDROCHLORIDE 10 MG/ML
0.1 INJECTION, SOLUTION INFILTRATION; PERINEURAL ONCE
Status: DISCONTINUED | OUTPATIENT
Start: 2025-01-29 | End: 2025-01-29 | Stop reason: HOSPADM

## 2025-01-29 RX ORDER — MEPERIDINE HYDROCHLORIDE 25 MG/ML
12.5 INJECTION INTRAMUSCULAR; INTRAVENOUS; SUBCUTANEOUS EVERY 10 MIN PRN
Status: DISCONTINUED | OUTPATIENT
Start: 2025-01-29 | End: 2025-01-29 | Stop reason: HOSPADM

## 2025-01-29 RX ORDER — PROPOFOL 10 MG/ML
INJECTION, EMULSION INTRAVENOUS CONTINUOUS PRN
Status: DISCONTINUED | OUTPATIENT
Start: 2025-01-29 | End: 2025-01-29

## 2025-01-29 RX ORDER — HYDRALAZINE HYDROCHLORIDE 20 MG/ML
5 INJECTION INTRAMUSCULAR; INTRAVENOUS EVERY 30 MIN PRN
Status: DISCONTINUED | OUTPATIENT
Start: 2025-01-29 | End: 2025-01-29 | Stop reason: HOSPADM

## 2025-01-29 RX ORDER — ONDANSETRON HYDROCHLORIDE 2 MG/ML
4 INJECTION, SOLUTION INTRAVENOUS ONCE AS NEEDED
Status: DISCONTINUED | OUTPATIENT
Start: 2025-01-29 | End: 2025-01-29 | Stop reason: HOSPADM

## 2025-01-29 RX ORDER — SODIUM CHLORIDE, SODIUM LACTATE, POTASSIUM CHLORIDE, CALCIUM CHLORIDE 600; 310; 30; 20 MG/100ML; MG/100ML; MG/100ML; MG/100ML
INJECTION, SOLUTION INTRAVENOUS CONTINUOUS PRN
Status: DISCONTINUED | OUTPATIENT
Start: 2025-01-29 | End: 2025-01-29

## 2025-01-29 RX ORDER — FENTANYL CITRATE 50 UG/ML
INJECTION, SOLUTION INTRAMUSCULAR; INTRAVENOUS AS NEEDED
Status: DISCONTINUED | OUTPATIENT
Start: 2025-01-29 | End: 2025-01-29

## 2025-01-29 RX ORDER — FLUMAZENIL 0.1 MG/ML
INJECTION INTRAVENOUS ONCE
Status: CANCELLED | OUTPATIENT
Start: 2025-01-29 | End: 2025-01-29

## 2025-01-29 RX ORDER — LIDOCAINE HYDROCHLORIDE 10 MG/ML
INJECTION, SOLUTION INFILTRATION; PERINEURAL AS NEEDED
Status: DISCONTINUED | OUTPATIENT
Start: 2025-01-29 | End: 2025-01-29

## 2025-01-29 RX ORDER — BUPIVACAINE HYDROCHLORIDE 5 MG/ML
INJECTION, SOLUTION PERINEURAL AS NEEDED
Status: DISCONTINUED | OUTPATIENT
Start: 2025-01-29 | End: 2025-01-29 | Stop reason: HOSPADM

## 2025-01-29 RX ORDER — NALOXONE HYDROCHLORIDE 0.4 MG/ML
0.16 INJECTION, SOLUTION INTRAMUSCULAR; INTRAVENOUS; SUBCUTANEOUS ONCE
Status: COMPLETED | OUTPATIENT
Start: 2025-01-29 | End: 2025-01-29

## 2025-01-29 RX ORDER — SODIUM CHLORIDE, SODIUM LACTATE, POTASSIUM CHLORIDE, CALCIUM CHLORIDE 600; 310; 30; 20 MG/100ML; MG/100ML; MG/100ML; MG/100ML
100 INJECTION, SOLUTION INTRAVENOUS CONTINUOUS
Status: ACTIVE | OUTPATIENT
Start: 2025-01-29 | End: 2025-01-29

## 2025-01-29 RX ORDER — ONDANSETRON HYDROCHLORIDE 2 MG/ML
INJECTION, SOLUTION INTRAVENOUS AS NEEDED
Status: DISCONTINUED | OUTPATIENT
Start: 2025-01-29 | End: 2025-01-29

## 2025-01-29 RX ORDER — OXYCODONE HYDROCHLORIDE 5 MG/1
5 TABLET ORAL EVERY 6 HOURS PRN
Qty: 20 TABLET | Refills: 0 | Status: SHIPPED | OUTPATIENT
Start: 2025-01-29 | End: 2025-02-03

## 2025-01-29 RX ORDER — ALBUTEROL SULFATE 0.83 MG/ML
2.5 SOLUTION RESPIRATORY (INHALATION) ONCE AS NEEDED
Status: DISCONTINUED | OUTPATIENT
Start: 2025-01-29 | End: 2025-01-29 | Stop reason: HOSPADM

## 2025-01-29 RX ORDER — FENTANYL CITRATE 50 UG/ML
50 INJECTION, SOLUTION INTRAMUSCULAR; INTRAVENOUS EVERY 5 MIN PRN
Status: DISCONTINUED | OUTPATIENT
Start: 2025-01-29 | End: 2025-01-29 | Stop reason: HOSPADM

## 2025-01-29 RX ORDER — PROPOFOL 10 MG/ML
INJECTION, EMULSION INTRAVENOUS AS NEEDED
Status: DISCONTINUED | OUTPATIENT
Start: 2025-01-29 | End: 2025-01-29

## 2025-01-29 RX ADMIN — MIDAZOLAM 1 MG: 1 INJECTION INTRAMUSCULAR; INTRAVENOUS at 12:46

## 2025-01-29 RX ADMIN — PROPOFOL 100 MCG/KG/MIN: 10 INJECTION, EMULSION INTRAVENOUS at 12:47

## 2025-01-29 RX ADMIN — PROPOFOL 30 MG: 10 INJECTION, EMULSION INTRAVENOUS at 12:50

## 2025-01-29 RX ADMIN — PROPOFOL 30 MG: 10 INJECTION, EMULSION INTRAVENOUS at 12:48

## 2025-01-29 RX ADMIN — MIDAZOLAM 1 MG: 1 INJECTION INTRAMUSCULAR; INTRAVENOUS at 12:42

## 2025-01-29 RX ADMIN — PROPOFOL 40 MG: 10 INJECTION, EMULSION INTRAVENOUS at 12:49

## 2025-01-29 RX ADMIN — VANCOMYCIN 1000 MG: 1 INJECTION, SOLUTION INTRAVENOUS at 12:38

## 2025-01-29 RX ADMIN — FENTANYL CITRATE 50 MCG: 50 INJECTION, SOLUTION INTRAMUSCULAR; INTRAVENOUS at 13:02

## 2025-01-29 RX ADMIN — LIDOCAINE HYDROCHLORIDE 5 ML: 10 INJECTION, SOLUTION INFILTRATION; PERINEURAL at 12:56

## 2025-01-29 RX ADMIN — ONDANSETRON HYDROCHLORIDE 4 MG: 2 INJECTION INTRAMUSCULAR; INTRAVENOUS at 13:02

## 2025-01-29 RX ADMIN — FENTANYL CITRATE 50 MCG: 50 INJECTION, SOLUTION INTRAMUSCULAR; INTRAVENOUS at 12:58

## 2025-01-29 RX ADMIN — PROPOFOL 50 MG: 10 INJECTION, EMULSION INTRAVENOUS at 13:07

## 2025-01-29 RX ADMIN — NALOXONE HYDROCHLORIDE 0.16 MG: 0.4 INJECTION, SOLUTION INTRAMUSCULAR; INTRAVENOUS; SUBCUTANEOUS at 16:02

## 2025-01-29 RX ADMIN — SODIUM CHLORIDE, POTASSIUM CHLORIDE, SODIUM LACTATE AND CALCIUM CHLORIDE: 600; 310; 30; 20 INJECTION, SOLUTION INTRAVENOUS at 12:43

## 2025-01-29 SDOH — HEALTH STABILITY: MENTAL HEALTH: CURRENT SMOKER: 0

## 2025-01-29 ASSESSMENT — PAIN - FUNCTIONAL ASSESSMENT
PAIN_FUNCTIONAL_ASSESSMENT: FLACC (FACE, LEGS, ACTIVITY, CRY, CONSOLABILITY)
PAIN_FUNCTIONAL_ASSESSMENT: 0-10
PAIN_FUNCTIONAL_ASSESSMENT: 0-10
PAIN_FUNCTIONAL_ASSESSMENT: FLACC (FACE, LEGS, ACTIVITY, CRY, CONSOLABILITY)
PAIN_FUNCTIONAL_ASSESSMENT: 0-10
PAIN_FUNCTIONAL_ASSESSMENT: 0-10
PAIN_FUNCTIONAL_ASSESSMENT: FLACC (FACE, LEGS, ACTIVITY, CRY, CONSOLABILITY)
PAIN_FUNCTIONAL_ASSESSMENT: 0-10
PAIN_FUNCTIONAL_ASSESSMENT: FLACC (FACE, LEGS, ACTIVITY, CRY, CONSOLABILITY)

## 2025-01-29 ASSESSMENT — PAIN SCALES - GENERAL
PAINLEVEL_OUTOF10: 5 - MODERATE PAIN
PAINLEVEL_OUTOF10: 7
PAINLEVEL_OUTOF10: 0 - NO PAIN
PAIN_LEVEL: 2
PAINLEVEL_OUTOF10: 0 - NO PAIN

## 2025-01-29 ASSESSMENT — COLUMBIA-SUICIDE SEVERITY RATING SCALE - C-SSRS
2. HAVE YOU ACTUALLY HAD ANY THOUGHTS OF KILLING YOURSELF?: NO
6. HAVE YOU EVER DONE ANYTHING, STARTED TO DO ANYTHING, OR PREPARED TO DO ANYTHING TO END YOUR LIFE?: NO
1. IN THE PAST MONTH, HAVE YOU WISHED YOU WERE DEAD OR WISHED YOU COULD GO TO SLEEP AND NOT WAKE UP?: NO

## 2025-01-29 ASSESSMENT — PAIN DESCRIPTION - DESCRIPTORS: DESCRIPTORS: ACHING

## 2025-01-29 NOTE — ANESTHESIA POSTPROCEDURE EVALUATION
Patient: Benja Luis    Procedure Summary       Date: 01/29/25 Room / Location: [facility] OR 02 / [facility]    Anesthesia Start: 1243 Anesthesia Stop: 1338    Procedure: Second Toe Amputation (Right) Diagnosis:       Chronic osteomyelitis of toe, right (Multi)      Acquired hammer toe of right foot      (Chronic osteomyelitis of toe, right (Multi) [M86.671])      (Acquired hammer toe of right foot [M20.41])    Surgeons: Chato Chang DPM Responsible Provider: Sin Rosales MD    Anesthesia Type: MAC ASA Status: 2            Anesthesia Type: MAC    Vitals Value Taken Time   /81 01/29/25 1742   Temp 36.2 °C (97.2 °F) 01/29/25 1425   Pulse 91 01/29/25 1745   Resp 24 01/29/25 1745   SpO2 97 % 01/29/25 1744   Vitals shown include unfiled device data.    Anesthesia Post Evaluation    Patient location during evaluation: PACU  Patient participation: complete - patient participated  Level of consciousness: sleepy but conscious  Pain score: 2  Pain management: adequate  Multimodal analgesia pain management approach  Airway patency: patent  Cardiovascular status: acceptable  Respiratory status: acceptable  Hydration status: acceptable  Postoperative Nausea and Vomiting: none        There were no known notable events for this encounter.

## 2025-01-29 NOTE — OP NOTE
Second Toe Amputation (R) Operative Note     Date: 2025  OR Location: [facility]    Name: Benja Luis, : 1970, Age: 54 y.o., MRN: [id number], Sex: female    Diagnosis  Pre-op Diagnosis      * Chronic osteomyelitis of toe, right (Multi) [M86.671]     * Acquired hammer toe of right foot [M20.41] Post-op Diagnosis     * Chronic osteomyelitis of toe, right (Multi) [M86.671]     * Acquired hammer toe of right foot [M20.41]     Procedures    1.  Right second toe amputation CPT 73889      Surgeons      * Chato Chang - Primary    Resident/Fellow/Other Assistant:  Surgeons and Role:  * No surgeons found with a matching role *    Staff:   Circulator: Jairo Dobbinsub Person: Leslie Kinsey Person: Ida    Anesthesia Staff: Anesthesiologist: Sin Rosales MD  CRNA: ALEXANDR Hale-CRNA    Procedure Summary  Anesthesia: Monitor Anesthesia Care  ASA: II  Estimated Blood Loss: 40 mL  Intra-op Medications:   Administrations occurring from 1230 to 1340 on 25:   Medication Name Total Dose   BUPivacaine HCl (Marcaine) 0.5 % (5 mg/mL) injection 20 mL   vancomycin (Xellia) 1,000 mg in diluent combination  mL Cannot be calculated   fentaNYL PF 0.05 mg/mL 100 mcg   LR infusion Cannot be calculated   lidocaine (Xylocaine) 1 % 5 mL   midazolam (Versed) 1 mg/1 mL 2 mg   ondansetron 2 mg/mL 4 mg   propofol (Diprivan) infusion 10 mg/mL 250.92 mg   propofol (Diprivan) injection 10 mg/mL 150 mg              Anesthesia Record               Intraprocedure I/O Totals          Intake    Propofol Drip 0.00 mL    The total shown is the total volume documented since Anesthesia Start was filed.    LR infusion 133.00 mL    Total Intake 133 mL       Output    Est. Blood Loss 5 mL    Total Output 5 mL       Net    Net Volume 128 mL          Specimen:   ID Type Source Tests Collected by Time   1 : DIGIT SECOND, RIGHT FOOT Tissue DIGIT SECOND, RIGHT FOOT SURGICAL PATHOLOGY EXAM Chato Chang, DPJONI 2025 1309   2 :  INFECTION DIGIT SECOND, RIGHT FOOT Tissue DIGIT SECOND, RIGHT FOOT SURGICAL PATHOLOGY EXAM Cahto Chang, OCTAVIANO 1/29/2025 1309   3 : CLEAN MARGIN DIGIT SECOND, RIGHT FOOT Tissue DIGIT SECOND, RIGHT FOOT SURGICAL PATHOLOGY EXAM Chato Chang, DPM 1/29/2025 1309                 Drains and/or Catheters: * None in log *    Tourniquet Times: None        Implants: None    Findings: Infected right second toe.    Indications: Benja Luis is an 54 y.o. female who is having surgery for Chronic osteomyelitis of toe, right (Multi) [M86.671]  Acquired hammer toe of right foot [M20.41].     This is a 54-year-old female patient of mine.  She has exhausted conservative measures for a chronic ulcer and wound with draining sinus to her right second toe.  This has been ongoing for years.  Comes and goes.  Exhausted IV antibiotics, oral antibiotics in the past without any sort of long-lasting improvement.  We have discussed risks and complications and benefits of amputation.  She is amenable to proceeding.  Consent was signed placed in the patient's chart.  No guarantees were made or given.    The patient was seen in the preoperative area. The risks, benefits, complications, treatment options, non-operative alternatives, expected recovery and outcomes were discussed with the patient. The possibilities of reaction to medication, pulmonary aspiration, injury to surrounding structures, bleeding, recurrent infection, the need for additional procedures, failure to diagnose a condition, and creating a complication requiring transfusion or operation were discussed with the patient. The patient concurred with the proposed plan, giving informed consent.  The site of surgery was properly noted/marked if necessary per policy. The patient has been actively warmed in preoperative area. Preoperative antibiotics have been ordered and given within 1 hours of incision. Venous thrombosis prophylaxis have been ordered including unilateral sequential  compression device    Procedure Details:   This 54-year-old patient of mine was brought back from PACU to the OR and placed on the OR table in a secure supine position.  The above-mentioned anesthesia was administered.  A timeout was performed and all were in agreement.  Patient was first injected with 20 cc half percent Marcaine in a local block fashion.  They were then cleansed with alcohol.  A formal prep and drape was then performed with Betadine.  Timeout was performed and all were in agreement according to patient identifiers laterality and surgical site.    1.  Right second toe amputation CPT 46101    A vertical fishmouth incision was made around the infected right second toe.  I dissected deep to the metatarsal phalangeal joint.  I was able to disarticulate the toe at the metatarsal phalangeal joint and placed this on the back table.  The remaining tissue actually appeared healthy and well without significant purulence, or residual infection.  The tendons appeared well.  The metatarsal head appeared healthy and hard.  I flushed copiously with 3 L of sterile saline.  I then closed the surgical site with 1 Vicryl stitch followed by nylon suture.    I dissected the toe on the back table.  I took the area of bone directly deep to the wound and sent this for pathology labeled as second toe infection for pathologic evaluation.  I also sent the base of the proximal phalanx as a clearance fragment.    She was bandaged with Betadine soaked Adaptic, 4 x 4's, ABD and Ace bandage.  She is to be heel touch weightbearing.  Discharged with doxycycline antibiotics for 1 week and oxycodone for pain.    Complications:  None; patient tolerated the procedure well.    Disposition: PACU - hemodynamically stable.  Condition: stable                 Additional Details: None    Attending Attestation: I was present and scrubbed for the key portions of the procedure.    Chato Chang  Phone Number: [phone]

## 2025-01-29 NOTE — PROGRESS NOTES
Pt had uncomplicated amp of 2nd toe. She had minimal narcotics in the operating room. She has been extremely somnolent and unresponsive for 2.5 hrs in PACU. Due jackie concern/suspicion that she may have taken an undisclosed med prior to surgery, decision was made to give Narcan.     This did result in some moderate arousal that led to her being in a state acceptable for discharge.

## 2025-01-29 NOTE — ANESTHESIA PREPROCEDURE EVALUATION
Patient: Benja Luis    Procedure Information       Date/Time: 01/29/25 1230    Procedure: Second Toe Amputation (Right)    Location: [facility] / [facility]    Surgeons: Chato Chang DPM            Relevant Problems   Cardiac   (+) Chest pain      Pulmonary   (+) Asthma exacerbation (HHS-HCC)      Neuro   (+) Bipolar affective, mixed (Multi)   (+) TAMMI (generalized anxiety disorder)   (+) Mixed anxiety and depressive disorder   (+) Panic disorder   (+) Psychiatric pseudoseizure      GI   (+) Crohn's disease (Multi)   (+) Hiatal hernia      Musculoskeletal   (+) Chronic pain syndrome   (+) Scoliosis      ID   (+) Impetigo       Clinical information reviewed:   Tobacco  Allergies  Meds  Problems  Med Hx  Surg Hx  OB Status    Fam Hx  Soc Hx        NPO Detail:  NPO/Void Status  Carbohydrate Drink Given Prior to Surgery? : N  Date of Last Liquid: 01/29/25  Time of Last Liquid: 1830  Date of Last Solid: 01/28/25  Time of Last Solid: 2000  Last Intake Type: Clear fluids  Time of Last Void: 1121         Physical Exam    Airway  Mallampati: II  TM distance: >3 FB  Neck ROM: full     Cardiovascular - normal exam     Dental - normal exam     Pulmonary - normal exam     Abdominal - normal exam             Anesthesia Plan    History of general anesthesia?: yes  History of complications of general anesthesia?: no    ASA 2     MAC     The patient is not a current smoker.  Patient was not previously instructed to abstain from smoking on day of procedure.  Patient did not smoke on day of procedure.  Education provided regarding risk of obstructive sleep apnea.  intravenous induction   Postoperative administration of opioids is intended.  Trial extubation is planned.  Anesthetic plan and risks discussed with patient.  Use of blood products discussed with patient who consented to blood products.    Plan discussed with CAA, attending and CRNA.

## 2025-01-29 NOTE — DISCHARGE INSTRUCTIONS
PODIATRIC SURGERY POST-OP INSTRUCTIONS    POST-OP INSTRUCTIONS  You must have a responsible adult drive you home and stay with you for the first 24 hours.    Do not drive a car or drink any alcohol for 24 hours after surgery.  You may have mild nausea, a sore throat or raspy voice for a few days.    Keep dressing clean, dry and intact until your first post-operative appointment.  Do not shower unless using a cast protector to protect dressing.  If dressing gets wet, please call office immediately as this can lead to increased risk of infection or wound dehiscence.   Elevate surgical extremity as much as possible to help with pain and swelling.  Place ice pack behind knee of surgical extremity.  Please remain partial weight bearing to the heel with surgical shoe in place.  You were prescribed oxycodone for pain; please take as directed on the label.  Please call to schedule, or to confirm a previously scheduled, post-operative appointment with Dr. Chang.  Should any problems, questions, or concerns arise, please call the clinic office.    WHEN TO CALL YOUR DOCTOR:  Fever (>100.4°F or 38.0°C) or chills.  Incision problems such as redness, warmth, swelling, or foul smelling drainage.  Severe nausea or persistent vomiting.  Pain and swelling in your legs, especially if it is only on one side and not the other.  Pain with urination, cloudy urine, or foul smelling urine.  Or if you have any other problems or questions.  CALL 911 or go to the ED if you have any shortness of breath, difficulty breathing, or chest pain.    Chato Chang DPM  [facility]  [address], [place]. [address]  [place] [id number] [phone]

## 2025-01-30 NOTE — POST-PROCEDURE NOTE
1808- pt brought back from pacu,verbal report received. Pt is alert and awake. Snack and drink given.     1930- vss. Dc instructions given and explained to pt. Pt verbally understood. Post op shoe given per order. Pt tolerating snack and drink. Pt getting dressed with assistance of this nurse. Rx to go meds with patient.     1945- pt brought down to [facility] via wheelchair where she was picked up by her friend.

## 2025-02-11 LAB
LABORATORY COMMENT REPORT: NORMAL
PATH REPORT.FINAL DX SPEC: NORMAL
PATH REPORT.GROSS SPEC: NORMAL
PATH REPORT.RELEVANT HX SPEC: NORMAL
PATH REPORT.TOTAL CANCER: NORMAL

## 2025-02-16 DIAGNOSIS — G89.18 POST-OPERATIVE PAIN: Primary | ICD-10-CM

## 2025-02-16 RX ORDER — OXYCODONE HYDROCHLORIDE 5 MG/1
5 TABLET ORAL EVERY 6 HOURS PRN
Qty: 20 TABLET | Refills: 0 | Status: SHIPPED | OUTPATIENT
Start: 2025-02-16 | End: 2025-02-21

## (undated) DEVICE — Device

## (undated) DEVICE — SUTURE, ETHILON, 3-0, 18 IN, PS1, BLACK

## (undated) DEVICE — GLOVE, SURGICAL, PROTEXIS PI , 8.0, PF, LF

## (undated) DEVICE — GLOVE, SURGICAL, PROTEXIS PI ORTHO, 8.0, PF, LF

## (undated) DEVICE — SOLUTION, INJECTION, SODIUM CHLORIDE 9%, 3000ML

## (undated) DEVICE — PREP TRAY, VAGINAL

## (undated) DEVICE — BLADE, SURGICAL, POLYMER COATED P15, STERILE, DISP

## (undated) DEVICE — DRESSING, NON-ADHERENT, 3 X 3 IN, STERILE